# Patient Record
Sex: MALE | Race: WHITE | NOT HISPANIC OR LATINO | Employment: OTHER | URBAN - METROPOLITAN AREA
[De-identification: names, ages, dates, MRNs, and addresses within clinical notes are randomized per-mention and may not be internally consistent; named-entity substitution may affect disease eponyms.]

---

## 2023-05-19 LAB
6-ACETYLMORPHINE: <25 NG/ML
7-AMINOCLONAZEPAM: <25 NG/ML
ALPHA-HYDROXYALPRAZOLAM: <25 NG/ML
ALPHA-HYDROXYMIDAZOLAM: <25 NG/ML
ALPRAZOLAM: <25 NG/ML
AMPHETAMINE (PRESENCE) IN URINE BY SCREEN METHOD: NORMAL
BARBITURATES PRESENCE IN URINE BY SCREEN METHOD: NORMAL
CANNABINOIDS IN URINE BY SCREEN METHOD: NORMAL
CHLORDIAZEPOXIDE: <25 NG/ML
CLONAZEPAM: <25 NG/ML
COCAINE (PRESENCE) IN URINE BY SCREEN METHOD: NORMAL
CODEINE: <50 NG/ML
CREATINE, URINE FOR DRUG: 210.2 MG/DL
DIAZEPAM: <25 NG/ML
DRUG SCREEN COMMENT URINE: NORMAL
EDDP: <25 NG/ML
FENTANYL CONFIRMATION, URINE: <2.5 NG/ML
HYDROCODONE: <25 NG/ML
HYDROMORPHONE: <25 NG/ML
LORAZEPAM: <25 NG/ML
METHADONE CONFIRMATION,URINE: <25 NG/ML
MIDAZOLAM: <25 NG/ML
MORPHINE URINE: <50 NG/ML
NORDIAZEPAM: <25 NG/ML
NORFENTANYL: <2.5 NG/ML
NORHYDROCODONE: <25 NG/ML
NOROXYCODONE: <25 NG/ML
O-DESMETHYLTRAMADOL: <50 NG/ML
OXAZEPAM: <25 NG/ML
OXYCODONE: <25 NG/ML
OXYMORPHONE: <25 NG/ML
PHENCYCLIDINE (PRESENCE) IN URINE BY SCREEN METHOD: NORMAL
TEMAZEPAM: <25 NG/ML
TRAMADOL: <50 NG/ML
ZOLPIDEM METABOLITE (ZCA): <25 NG/ML
ZOLPIDEM: <25 NG/ML

## 2023-06-22 LAB
ALANINE AMINOTRANSFERASE (SGPT) (U/L) IN SER/PLAS: 22 U/L (ref 10–52)
ALBUMIN (G/DL) IN SER/PLAS: 4.6 G/DL (ref 3.4–5)
ALKALINE PHOSPHATASE (U/L) IN SER/PLAS: 93 U/L (ref 33–120)
AMMONIA (UMOL/L) IN PLASMA: 73 UMOL/L
ANION GAP IN SER/PLAS: 16 MMOL/L (ref 10–20)
ASPARTATE AMINOTRANSFERASE (SGOT) (U/L) IN SER/PLAS: 18 U/L (ref 9–39)
BASOPHILS (10*3/UL) IN BLOOD BY AUTOMATED COUNT: 0.08 X10E9/L (ref 0–0.1)
BASOPHILS/100 LEUKOCYTES IN BLOOD BY AUTOMATED COUNT: 0.8 % (ref 0–2)
BILIRUBIN DIRECT (MG/DL) IN SER/PLAS: 0.1 MG/DL (ref 0–0.3)
BILIRUBIN TOTAL (MG/DL) IN SER/PLAS: 0.6 MG/DL (ref 0–1.2)
CALCIUM (MG/DL) IN SER/PLAS: 9.6 MG/DL (ref 8.6–10.6)
CARBON DIOXIDE, TOTAL (MMOL/L) IN SER/PLAS: 24 MMOL/L (ref 21–32)
CHLORIDE (MMOL/L) IN SER/PLAS: 108 MMOL/L (ref 98–107)
CHOLESTEROL (MG/DL) IN SER/PLAS: 159 MG/DL (ref 0–199)
CHOLESTEROL IN HDL (MG/DL) IN SER/PLAS: 34.3 MG/DL
CHOLESTEROL/HDL RATIO: 4.6
CREATININE (MG/DL) IN SER/PLAS: 0.95 MG/DL (ref 0.5–1.3)
EOSINOPHILS (10*3/UL) IN BLOOD BY AUTOMATED COUNT: 0.23 X10E9/L (ref 0–0.7)
EOSINOPHILS/100 LEUKOCYTES IN BLOOD BY AUTOMATED COUNT: 2.4 % (ref 0–6)
ERYTHROCYTE DISTRIBUTION WIDTH (RATIO) BY AUTOMATED COUNT: 16.3 % (ref 11.5–14.5)
ERYTHROCYTE MEAN CORPUSCULAR HEMOGLOBIN CONCENTRATION (G/DL) BY AUTOMATED: 31.5 G/DL (ref 32–36)
ERYTHROCYTE MEAN CORPUSCULAR VOLUME (FL) BY AUTOMATED COUNT: 87 FL (ref 80–100)
ERYTHROCYTES (10*6/UL) IN BLOOD BY AUTOMATED COUNT: 7.55 X10E12/L (ref 4.5–5.9)
GFR MALE: >90 ML/MIN/1.73M2
GLUCOSE (MG/DL) IN SER/PLAS: 74 MG/DL (ref 74–99)
HEMATOCRIT (%) IN BLOOD BY AUTOMATED COUNT: 65.5 % (ref 41–52)
HEMOGLOBIN (G/DL) IN BLOOD: 20.6 G/DL (ref 13.5–17.5)
IMMATURE GRANULOCYTES/100 LEUKOCYTES IN BLOOD BY AUTOMATED COUNT: 0.3 % (ref 0–0.9)
KEPPRA: 21 UG/ML (ref 10–40)
LDL: 84 MG/DL (ref 0–99)
LEUKOCYTES (10*3/UL) IN BLOOD BY AUTOMATED COUNT: 9.5 X10E9/L (ref 4.4–11.3)
LYMPHOCYTES (10*3/UL) IN BLOOD BY AUTOMATED COUNT: 4.04 X10E9/L (ref 1.2–4.8)
LYMPHOCYTES/100 LEUKOCYTES IN BLOOD BY AUTOMATED COUNT: 42.7 % (ref 13–44)
MAGNESIUM (MG/DL) IN SER/PLAS: 2.13 MG/DL (ref 1.6–2.4)
MONOCYTES (10*3/UL) IN BLOOD BY AUTOMATED COUNT: 0.69 X10E9/L (ref 0.1–1)
MONOCYTES/100 LEUKOCYTES IN BLOOD BY AUTOMATED COUNT: 7.3 % (ref 2–10)
NEUTROPHILS (10*3/UL) IN BLOOD BY AUTOMATED COUNT: 4.39 X10E9/L (ref 1.2–7.7)
NEUTROPHILS/100 LEUKOCYTES IN BLOOD BY AUTOMATED COUNT: 46.5 % (ref 40–80)
NON HDL CHOLESTEROL: 125 MG/DL
NRBC (PER 100 WBCS) BY AUTOMATED COUNT: 0 /100 WBC (ref 0–0)
PLATELETS (10*3/UL) IN BLOOD AUTOMATED COUNT: 209 X10E9/L (ref 150–450)
POTASSIUM (MMOL/L) IN SER/PLAS: 3.5 MMOL/L (ref 3.5–5.3)
PROTEIN TOTAL: 6.6 G/DL (ref 6.4–8.2)
SODIUM (MMOL/L) IN SER/PLAS: 144 MMOL/L (ref 136–145)
TRIGLYCERIDE (MG/DL) IN SER/PLAS: 204 MG/DL (ref 0–149)
UREA NITROGEN (MG/DL) IN SER/PLAS: 21 MG/DL (ref 6–23)
VLDL: 41 MG/DL (ref 0–40)

## 2023-06-26 LAB — OXCARB OR ESLICARB METABOLITE (MHD): 13 UG/ML (ref 3–35)

## 2023-07-19 LAB
AMMONIA (UMOL/L) IN PLASMA: NORMAL
MAGNESIUM (MG/DL) IN SER/PLAS: NORMAL

## 2023-09-29 LAB
ALANINE AMINOTRANSFERASE (SGPT) (U/L) IN SER/PLAS: 18 U/L (ref 10–52)
ALBUMIN (G/DL) IN SER/PLAS: 4.6 G/DL (ref 3.4–5)
ALKALINE PHOSPHATASE (U/L) IN SER/PLAS: 90 U/L (ref 33–120)
AMMONIA (UMOL/L) IN PLASMA: 54 UMOL/L
AMMONIA (UMOL/L) IN PLASMA: NORMAL
ANION GAP IN SER/PLAS: 14 MMOL/L (ref 10–20)
ASPARTATE AMINOTRANSFERASE (SGOT) (U/L) IN SER/PLAS: 15 U/L (ref 9–39)
BASOPHILS (10*3/UL) IN BLOOD BY AUTOMATED COUNT: 0.05 X10E9/L (ref 0–0.1)
BASOPHILS/100 LEUKOCYTES IN BLOOD BY AUTOMATED COUNT: 0.6 % (ref 0–2)
BILIRUBIN DIRECT (MG/DL) IN SER/PLAS: 0.1 MG/DL (ref 0–0.3)
BILIRUBIN TOTAL (MG/DL) IN SER/PLAS: 0.5 MG/DL (ref 0–1.2)
CALCIUM (MG/DL) IN SER/PLAS: 9.7 MG/DL (ref 8.6–10.6)
CARBON DIOXIDE, TOTAL (MMOL/L) IN SER/PLAS: 22 MMOL/L (ref 21–32)
CHLORIDE (MMOL/L) IN SER/PLAS: 111 MMOL/L (ref 98–107)
CHOLESTEROL (MG/DL) IN SER/PLAS: 155 MG/DL (ref 0–199)
CHOLESTEROL IN HDL (MG/DL) IN SER/PLAS: 37.5 MG/DL
CHOLESTEROL/HDL RATIO: 4.1
CREATININE (MG/DL) IN SER/PLAS: 1.07 MG/DL (ref 0.5–1.3)
EOSINOPHILS (10*3/UL) IN BLOOD BY AUTOMATED COUNT: 0.15 X10E9/L (ref 0–0.7)
EOSINOPHILS/100 LEUKOCYTES IN BLOOD BY AUTOMATED COUNT: 1.9 % (ref 0–6)
ERYTHROCYTE DISTRIBUTION WIDTH (RATIO) BY AUTOMATED COUNT: 13.4 % (ref 11.5–14.5)
ERYTHROCYTE MEAN CORPUSCULAR HEMOGLOBIN CONCENTRATION (G/DL) BY AUTOMATED: 30.3 G/DL (ref 32–36)
ERYTHROCYTE MEAN CORPUSCULAR VOLUME (FL) BY AUTOMATED COUNT: 89 FL (ref 80–100)
ERYTHROCYTES (10*6/UL) IN BLOOD BY AUTOMATED COUNT: 5.98 X10E12/L (ref 4.5–5.9)
GFR MALE: 80 ML/MIN/1.73M2
GLUCOSE (MG/DL) IN SER/PLAS: 97 MG/DL (ref 74–99)
HEMATOCRIT (%) IN BLOOD BY AUTOMATED COUNT: 53.1 % (ref 41–52)
HEMOGLOBIN (G/DL) IN BLOOD: 16.1 G/DL (ref 13.5–17.5)
IMMATURE GRANULOCYTES/100 LEUKOCYTES IN BLOOD BY AUTOMATED COUNT: 0.3 % (ref 0–0.9)
KEPPRA: 18 UG/ML (ref 10–40)
LDL: 87 MG/DL (ref 0–99)
LEUKOCYTES (10*3/UL) IN BLOOD BY AUTOMATED COUNT: 7.9 X10E9/L (ref 4.4–11.3)
LYMPHOCYTES (10*3/UL) IN BLOOD BY AUTOMATED COUNT: 3.62 X10E9/L (ref 1.2–4.8)
LYMPHOCYTES/100 LEUKOCYTES IN BLOOD BY AUTOMATED COUNT: 46 % (ref 13–44)
MAGNESIUM (MG/DL) IN SER/PLAS: 2.36 MG/DL (ref 1.6–2.4)
MONOCYTES (10*3/UL) IN BLOOD BY AUTOMATED COUNT: 0.58 X10E9/L (ref 0.1–1)
MONOCYTES/100 LEUKOCYTES IN BLOOD BY AUTOMATED COUNT: 7.4 % (ref 2–10)
NEUTROPHILS (10*3/UL) IN BLOOD BY AUTOMATED COUNT: 3.45 X10E9/L (ref 1.2–7.7)
NEUTROPHILS/100 LEUKOCYTES IN BLOOD BY AUTOMATED COUNT: 43.8 % (ref 40–80)
NRBC (PER 100 WBCS) BY AUTOMATED COUNT: 0 /100 WBC (ref 0–0)
PLATELETS (10*3/UL) IN BLOOD AUTOMATED COUNT: 175 X10E9/L (ref 150–450)
POTASSIUM (MMOL/L) IN SER/PLAS: 4 MMOL/L (ref 3.5–5.3)
PROTEIN TOTAL: 7 G/DL (ref 6.4–8.2)
SODIUM (MMOL/L) IN SER/PLAS: 143 MMOL/L (ref 136–145)
TRIGLYCERIDE (MG/DL) IN SER/PLAS: 155 MG/DL (ref 0–149)
UREA NITROGEN (MG/DL) IN SER/PLAS: 17 MG/DL (ref 6–23)
VLDL: 31 MG/DL (ref 0–40)

## 2023-10-03 LAB — OXCARB OR ESLICARB METABOLITE (MHD): 13 UG/ML (ref 3–35)

## 2023-11-15 ENCOUNTER — TELEMEDICINE (OUTPATIENT)
Dept: NEUROLOGY | Facility: CLINIC | Age: 58
End: 2023-11-15
Payer: MEDICARE

## 2023-11-15 DIAGNOSIS — G40.109 FOCAL EPILEPSY (MULTI): Primary | ICD-10-CM

## 2023-11-15 PROCEDURE — 99214 OFFICE O/P EST MOD 30 MIN: CPT | Performed by: NURSE PRACTITIONER

## 2023-11-15 PROCEDURE — 99214 OFFICE O/P EST MOD 30 MIN: CPT | Mod: GT,PO,95 | Performed by: NURSE PRACTITIONER

## 2023-11-15 RX ORDER — LEVETIRACETAM 500 MG/1
TABLET ORAL
Qty: 360 TABLET | Refills: 3 | Status: SHIPPED | OUTPATIENT
Start: 2023-11-15 | End: 2024-11-14

## 2023-11-15 RX ORDER — LEVETIRACETAM 1000 MG/1
1000 TABLET ORAL 2 TIMES DAILY
COMMUNITY
End: 2023-11-15 | Stop reason: SDUPTHER

## 2023-11-15 RX ORDER — LEVETIRACETAM 1000 MG/1
1000 TABLET ORAL 2 TIMES DAILY
Qty: 180 TABLET | Refills: 3 | Status: SHIPPED | OUTPATIENT
Start: 2023-11-15 | End: 2023-11-15 | Stop reason: WASHOUT

## 2023-11-15 RX ORDER — OXCARBAZEPINE 300 MG/1
300 TABLET, FILM COATED ORAL 2 TIMES DAILY
Qty: 180 TABLET | Refills: 3 | Status: SHIPPED | OUTPATIENT
Start: 2023-11-15 | End: 2024-11-14

## 2023-11-15 RX ORDER — OXCARBAZEPINE 300 MG/1
300 TABLET, FILM COATED ORAL 2 TIMES DAILY
COMMUNITY
Start: 2019-04-23 | End: 2023-11-15 | Stop reason: SDUPTHER

## 2023-11-15 NOTE — PROGRESS NOTES
Virtual or Telephone Consent    An interactive audio and video telecommunication system which permits real time communications between the patient (at the originating site) and provider (at the distant site) was utilized to provide this telehealth service.   Verbal consent was requested and obtained from Edin Delgado on this date, 11/15/23 for a telehealth visit.        Patient ID:  Edin Delgado 58 y.o.male presenting in follow-up for epilepsy.     HPI    Epilepsy Classification:  Epileptic Paroxysmal Episodes  Epileptogenic Zone: left temporal                 Epileptic seizure semiology:  1. Type 1: Dialeptic seizures ? bilateral clonic seizures                      Frequency: none for years, mostly during childhood.                  2. Type 2: asymptomatic left temporal EEG seizure pattern               Frequency: during hemiplegic migraine attack.               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 No seizures, complaint with his medication - no side effects. Levels are therapeutic. No falls, he is sleeping through the night         Review of Systems   All other systems reviewed and are negative.        RESULTS:  No EEG results found for the past 12 months    Orders Only on 2023   Component Date Value    Ammonia 2023 54 (A)     Glucose 2023 97     Sodium 2023 143     Potassium 2023 4.0     Chloride 2023 111 (H)     Bicarbonate 2023 22     Anion Gap 2023 14     Urea Nitrogen 2023 17     Creatinine 2023 1.07     GFR MALE 2023 80     Calcium 2023 9.7     Albumin 2023 4.6     Alkaline Phosphatase 2023 90     Total  Protein 09/29/2023 7.0     AST 09/29/2023 15     Total Bilirubin 09/29/2023 0.5     ALT (SGPT) 09/29/2023 18     Cholesterol 09/29/2023 155     HDL 09/29/2023 37.5 (A)     Cholesterol/HDL Ratio 09/29/2023 4.1     LDL 09/29/2023 87     VLDL 09/29/2023 31     Triglycerides 09/29/2023 155 (H)     Magnesium 09/29/2023 2.36     WBC 09/29/2023 7.9     nRBC 09/29/2023 0.0     RBC 09/29/2023 5.98 (H)     Hemoglobin 09/29/2023 16.1     Hematocrit 09/29/2023 53.1 (H)     MCV 09/29/2023 89     MCHC 09/29/2023 30.3 (L)     Platelets 09/29/2023 175     RDW 09/29/2023 13.4     Neutrophils % 09/29/2023 43.8     Immature Granulocytes %,* 09/29/2023 0.3     Lymphocytes % 09/29/2023 46.0     Monocytes % 09/29/2023 7.4     Eosinophils % 09/29/2023 1.9     Basophils % 09/29/2023 0.6     Neutrophils Absolute 09/29/2023 3.45     Lymphocytes Absolute 09/29/2023 3.62     Monocytes Absolute 09/29/2023 0.58     Eosinophils Absolute 09/29/2023 0.15     Basophils Absolute 09/29/2023 0.05     Oxcarb or Eslicarb Metab* 09/29/2023 13     KEPPRA 09/29/2023 18     Bilirubin, Direct 09/29/2023 0.1     Ammonia 09/29/2023 CANCELED        No results found for this or any previous visit (from the past 4464 hour(s)).    No MRI head results found for the past 12 months    No CT head results found for the past 12 months    No results found for this or any previous visit (from the past 4464 hour(s)).    Results for orders placed or performed in visit on 07/25/19   EEG    Narrative    Ordered by an unspecified provider.   Results for orders placed or performed in visit on 03/30/18   MR BRAIN W AND WO IV CONTRAST    Narrative    MRN: [id number]  Patient Name: JOHN FARLEY     STUDY:  MRI BRAIN W/WO CONTRAST; NR MRA HEAD W/O C; NR MRA NECK WO.C;  3/30/2018 12:18 pm     INDICATION:  Signs/Symptoms: Upgoing plantar reflex, unilateral hypertonia, Lie  Flat: Yes, Pre Med: No; Signs/Symptoms: Left sided weakness,  intermittent, concerning for conversion disorder,  "Lie Flat: Yes, Pre  Med: No.     COMPARISON:  May 2004.     ACCESSION NUMBER(S):  [id number]; [id number]; [id number]     ORDERING CLINICIAN:  LEENA NICOLAS     TECHNIQUE:  The brain was studied in the sagittal, axial and coronal planes  utilizing FLAIR, T1 and T2 weighted images both before and following  intravenous injection of 20 cc MultiHance.     FINDINGS:  *There is increased signal within the right cerebral cortex on FLAIR  and T2 weighted images in association with increased signal on  diffusion-weighted images not confirmed on ADC. The findings are  consistent with completed infarction in the distribution of the right  middle cerebral artery or right hemispheric encephalitis.  *There is no associated hemorrhage or mass effect  *There is no evidence of intracranial mass or extra-axial collection  *The skull base, paranasal sinuses and orbital structures are normal  *The brainstem and cerebellum are normal.  *Following contrast injection, there is gyriform enhancement  throughout the right hemisphere. This is a nonspecific finding  compatible with completed infarction as well as enhancement due to an  inflammatory process such as encephalitis with  * Magnetic resonance angiography  Axial 3-D time-of-flight acquisition was performed and multiplanar  reconstructions were made.  MRA at the carotid bifurcations reveals normal anatomic  configuration.  The vertebral arteries are normal.  There is no  evidence of narrowing or stenosis.  *MRA of the intracranial vessels demonstrates normal distal carotid  arteries, normal vertebral arteries, normal vertebrobasilar junction  and normal basilar artery. There is prominence of the right middle  cerebral artery possibly due to \"luxury perfusion\".     IMPRESSION:  *Findings consistent with completed infarct with gyriform enhancement  and luxury perfusion in the right hemisphere  *Findings consistent with right hemispheric encephalitis/cerebritis.  *THIS " EXAMINATION WAS INTERPRETED AT [facility] [place]   [place] ANGIO NECK WO IV CONTRAST    Narrative    MRN: [id number]  Patient Name: JOHN FARLEY     STUDY:  MRI BRAIN W/WO CONTRAST; NR MRA HEAD W/O C; NR MRA NECK WO.C;  3/30/2018 12:18 pm     INDICATION:  Signs/Symptoms: Upgoing plantar reflex, unilateral hypertonia, Lie  Flat: Yes, Pre Med: No; Signs/Symptoms: Left sided weakness,  intermittent, concerning for conversion disorder, Lie Flat: Yes, Pre  Med: No.     COMPARISON:  May 2004.     ACCESSION NUMBER(S):  [id number]; [id number]; [id number]     ORDERING CLINICIAN:  LEENA INIGUEZ; PENNY NICOLAS     TECHNIQUE:  The brain was studied in the sagittal, axial and coronal planes  utilizing FLAIR, T1 and T2 weighted images both before and following  intravenous injection of 20 cc MultiHance.     FINDINGS:  *There is increased signal within the right cerebral cortex on FLAIR  and T2 weighted images in association with increased signal on  diffusion-weighted images not confirmed on ADC. The findings are  consistent with completed infarction in the distribution of the right  middle cerebral artery or right hemispheric encephalitis.  *There is no associated hemorrhage or mass effect  *There is no evidence of intracranial mass or extra-axial collection  *The skull base, paranasal sinuses and orbital structures are normal  *The brainstem and cerebellum are normal.  *Following contrast injection, there is gyriform enhancement  throughout the right hemisphere. This is a nonspecific finding  compatible with completed infarction as well as enhancement due to an  inflammatory process such as encephalitis with  * Magnetic resonance angiography  Axial 3-D time-of-flight acquisition was performed and multiplanar  reconstructions were made.  MRA at the carotid bifurcations reveals normal anatomic  configuration.  The vertebral arteries are normal.  There is no  evidence of narrowing or stenosis.  *MRA of the intracranial vessels demonstrates normal  "distal carotid  arteries, normal vertebral arteries, normal vertebrobasilar junction  and normal basilar artery. There is prominence of the right middle  cerebral artery possibly due to \"luxury perfusion\".     IMPRESSION:  *Findings consistent with completed infarct with gyriform enhancement  and luxury perfusion in the right hemisphere  *Findings consistent with right hemispheric encephalitis/cerebritis.  *THIS EXAMINATION WAS INTERPRETED [place] [facility] [place]   [place] ANGIO HEAD WO IV CONTRAST    Narrative    MRN: [id number]  Patient Name: JOHN FARLEY     STUDY:  MRI BRAIN W/WO CONTRAST; NR MRA HEAD W/O C; NR MRA NECK WO.C;  3/30/2018 12:18 pm     INDICATION:  Signs/Symptoms: Upgoing plantar reflex, unilateral hypertonia, Lie  Flat: Yes, Pre Med: No; Signs/Symptoms: Left sided weakness,  intermittent, concerning for conversion disorder, Lie Flat: Yes, Pre  Med: No.     COMPARISON:  May 2004.     ACCESSION NUMBER(S):  [id number]; [id number]; [id number]     ORDERING CLINICIAN:  LEENA NICOLAS     TECHNIQUE:  The brain was studied in the sagittal, axial and coronal planes  utilizing FLAIR, T1 and T2 weighted images both before and following  intravenous injection of 20 cc MultiHance.     FINDINGS:  *There is increased signal within the right cerebral cortex on FLAIR  and T2 weighted images in association with increased signal on  diffusion-weighted images not confirmed on ADC. The findings are  consistent with completed infarction in the distribution of the right  middle cerebral artery or right hemispheric encephalitis.  *There is no associated hemorrhage or mass effect  *There is no evidence of intracranial mass or extra-axial collection  *The skull base, paranasal sinuses and orbital structures are normal  *The brainstem and cerebellum are normal.  *Following contrast injection, there is gyriform enhancement  throughout the right hemisphere. This is a nonspecific finding  compatible with completed infarction as " "well as enhancement due to an  inflammatory process such as encephalitis with  * Magnetic resonance angiography  Axial 3-D time-of-flight acquisition was performed and multiplanar  reconstructions were made.  MRA at the carotid bifurcations reveals normal anatomic  configuration.  The vertebral arteries are normal.  There is no  evidence of narrowing or stenosis.  *MRA of the intracranial vessels demonstrates normal distal carotid  arteries, normal vertebral arteries, normal vertebrobasilar junction  and normal basilar artery. There is prominence of the right middle  cerebral artery possibly due to \"luxury perfusion\".     IMPRESSION:  *Findings consistent with completed infarct with gyriform enhancement  and luxury perfusion in the right hemisphere  *Findings consistent with right hemispheric encephalitis/cerebritis.  [place] [facility] [place] were no vitals filed for this visit.    Neurologic Exam     Mental Status   Oriented to person, place, and time.   Attention: normal. Concentration: normal.   Level of consciousness: alert  Knowledge: consistent with education.   Normal comprehension.     Cranial Nerves   Eye movements intact, no facial droop, hearing grossly intact      Gait, Coordination, and Reflexes No focal deficits noted            ASSESSMENT & PLAN:   58 y.o. male presenting in follow-up for peviously diagnosed epilepsy. Semiology as described above    Problem List Items Addressed This Visit       Focal epilepsy (CMS/[facility]) - Primary     Stable, none for years   Continue -300 and -1500  Levels were therapeutic   RTC 9 months          Relevant Medications    OXcarbazepine (Trileptal) 300 mg tablet    levETIRAcetam (Keppra) 500 mg tablet                    "

## 2023-11-20 ENCOUNTER — OFFICE VISIT (OUTPATIENT)
Dept: NEUROLOGY | Facility: CLINIC | Age: 58
End: 2023-11-20
Payer: MEDICARE

## 2023-11-20 VITALS — RESPIRATION RATE: 16 BRPM | SYSTOLIC BLOOD PRESSURE: 100 MMHG | HEART RATE: 100 BPM | DIASTOLIC BLOOD PRESSURE: 60 MMHG

## 2023-11-20 DIAGNOSIS — G43.711 CHRONIC MIGRAINE WITHOUT AURA, INTRACTABLE, WITH STATUS MIGRAINOSUS: Primary | ICD-10-CM

## 2023-11-20 DIAGNOSIS — G40.109 FOCAL EPILEPSY (MULTI): ICD-10-CM

## 2023-11-20 PROCEDURE — 99214 OFFICE O/P EST MOD 30 MIN: CPT | Performed by: PSYCHIATRY & NEUROLOGY

## 2023-11-20 PROCEDURE — 1036F TOBACCO NON-USER: CPT | Performed by: PSYCHIATRY & NEUROLOGY

## 2023-11-20 RX ORDER — ERENUMAB-AOOE 140 MG/ML
INJECTION, SOLUTION SUBCUTANEOUS
COMMUNITY
Start: 2018-09-07 | End: 2024-04-17

## 2023-11-20 RX ORDER — DOCUSATE SODIUM 100 MG/1
100 CAPSULE, LIQUID FILLED ORAL
COMMUNITY
Start: 2019-11-01

## 2023-11-20 RX ORDER — SENNOSIDES 8.6 MG/1
17.2 TABLET ORAL
COMMUNITY
Start: 2020-07-02

## 2023-11-20 RX ORDER — DICLOFENAC SODIUM 10 MG/G
GEL TOPICAL
COMMUNITY
Start: 2023-08-21

## 2023-11-20 RX ORDER — OLANZAPINE 2.5 MG/1
1 TABLET ORAL 2 TIMES DAILY
COMMUNITY

## 2023-11-20 RX ORDER — CYCLOBENZAPRINE HCL 10 MG
TABLET ORAL
COMMUNITY
Start: 2023-03-06

## 2023-11-20 RX ORDER — LORAZEPAM 0.5 MG/1
TABLET ORAL
COMMUNITY
Start: 2020-08-20

## 2023-11-20 RX ORDER — ACETAZOLAMIDE 125 MG/1
TABLET ORAL 2 TIMES DAILY
COMMUNITY
Start: 2020-07-02 | End: 2024-02-02

## 2023-11-20 RX ORDER — ERGOCALCIFEROL 1.25 MG/1
1 CAPSULE ORAL
COMMUNITY
Start: 2018-05-23

## 2023-11-20 RX ORDER — IBUPROFEN 200 MG
TABLET ORAL
COMMUNITY
Start: 2020-07-02 | End: 2023-12-06

## 2023-11-20 RX ORDER — PANTOPRAZOLE SODIUM 40 MG/1
TABLET, DELAYED RELEASE ORAL
COMMUNITY
Start: 2019-11-01

## 2023-11-20 RX ORDER — 1.1% SODIUM FLUORIDE 11 MG/G
GEL DENTAL
COMMUNITY
Start: 2023-11-17

## 2023-11-20 RX ORDER — ATORVASTATIN CALCIUM 40 MG/1
1 TABLET, FILM COATED ORAL DAILY
COMMUNITY
Start: 2018-05-23

## 2023-11-20 RX ORDER — MAGNESIUM L-LACTATE 84 MG
TABLET, EXTENDED RELEASE ORAL
COMMUNITY
Start: 2019-11-01

## 2023-11-20 RX ORDER — RIFAXIMIN 550 MG/1
TABLET ORAL
COMMUNITY
Start: 2020-07-02

## 2023-11-20 RX ORDER — SODIUM FLUORIDE 1.1 G/100G
CREAM ORAL
COMMUNITY
Start: 2023-02-27

## 2023-11-20 RX ORDER — LORATADINE 10 MG/1
TABLET ORAL
COMMUNITY
Start: 2023-10-24

## 2023-11-20 ASSESSMENT — PATIENT HEALTH QUESTIONNAIRE - PHQ9
1. LITTLE INTEREST OR PLEASURE IN DOING THINGS: NOT AT ALL
2. FEELING DOWN, DEPRESSED OR HOPELESS: NOT AT ALL
SUM OF ALL RESPONSES TO PHQ9 QUESTIONS 1 AND 2: 0

## 2023-11-20 NOTE — PROGRESS NOTES
"  Experiencing 2-3 headaches per month on average. ( has ATP 1 A2 mutation) has had L temporal seizure/hemiplegic migraine semiology. Has Lorazepam for this but has not used in 2.5 years      Ibuprofen is helpful.   Does not take extra ibuprofen if has a headache. Able to work through it.     Takes 400mg ibuprofen every evening for other aches and pains and sometimes prevent headache.   Denies stomach upset from daily ibuprofen use.     Has rizatriptan and Lorazepam for hemiplegic migraine.  Has not needed in 2.5 years.     Continues Aimovig every 28 days. This works better than monthly dose.     Migraines occur left side or right side.  frontal and radiates to other side if becomes severe. Rare in occipital area anymore but did have one several weeks ago  Associated light sensitivity, noise sensitivity.   Triggers may be loud noises.     Sleeps well. \"Because of all the pills I take\"  Mood has been stable. Feels controlled.     Has copy of recent labs.     Had Flexeril for back pain after fall and twisting of back.     OARRS:  No data recorded  I have personally reviewed the OARRS report for Edin Delgado. I have considered the risks of abuse, dependence, addiction and diversion    Is the patient prescribed a combination of a benzodiazepine and opioid?  No    Last Urine Drug Screen / ordered today: No  Recent Results (from the past 8760 hour(s))   OPIATE/OPIOID/BENZO PRESCRIPTION COMPLIANCE    Collection Time: 05/17/23  1:12 PM   Result Value Ref Range    DRUG SCREEN COMMENT URINE SEE BELOW     Creatine, Urine 210.2 mg/dL    Amphetamine Screen, Urine PRESUMPTIVE NEGATIVE NEGATIVE    Barbiturate Screen, Urine PRESUMPTIVE NEGATIVE NEGATIVE    Cannabinoid Screen, Urine PRESUMPTIVE NEGATIVE NEGATIVE    Cocaine Screen, Urine PRESUMPTIVE NEGATIVE NEGATIVE    PCP Screen, Urine PRESUMPTIVE NEGATIVE NEGATIVE    7-Aminoclonazepam <25 Cutoff <25 ng/mL    Alpha-Hydroxyalprazolam <25 Cutoff <25 ng/mL    Alpha-Hydroxymidazolam " <25 Cutoff <25 ng/mL    Alprazolam <25 Cutoff <25 ng/mL    Chlordiazepoxide <25 Cutoff <25 ng/mL    Clonazepam <25 Cutoff <25 ng/mL    Diazepam <25 Cutoff <25 ng/mL    Lorazepam <25 Cutoff <25 ng/mL    Midazolam <25 Cutoff <25 ng/mL    Nordiazepam <25 Cutoff <25 ng/mL    Oxazepam <25 Cutoff <25 ng/mL    Temazepam <25 Cutoff <25 ng/mL    Zolpidem <25 Cutoff <25 ng/mL    Zolpidem Metabolite (ZCA) <25 Cutoff <25 ng/mL    6-Acetylmorphine <25 Cutoff <25 ng/mL    Codeine <50 Cutoff <50 ng/mL    Hydrocodone <25 Cutoff <25 ng/mL    Hydromorphone <25 Cutoff <25 ng/mL    Morphine Urine <50 Cutoff <50 ng/mL    Norhydrocodone <25 Cutoff <25 ng/mL    Noroxycodone <25 Cutoff <25 ng/mL    Oxycodone <25 Cutoff <25 ng/mL    Oxymorphone <25 Cutoff <25 ng/mL    Tramadol <50 Cutoff <50 ng/mL    O-Desmethyltramadol <50 Cutoff <50 ng/mL    Fentanyl <2.5 Cutoff<2.5 ng/mL    Norfentanyl <2.5 Cutoff<2.5 ng/mL    METHADONE CONFIRMATION,URINE <25 Cutoff <25 ng/mL    EDDP <25 Cutoff <25 ng/mL     N/A        Controlled Substance Agreement:  Date of the Last Agreement: 11/20/2023  Reviewed Controlled Substance Agreement including but not limited to the benefits, risks, and alternatives to treatment with a Controlled Substance medication(s).    Benzodiazepines:  What is the patient's goal of therapy? Seizure relief  Is this being achieved with current treatment? Yes but has not needed in 2 years    PHILIP-7:  No data recorded    Activities of Daily Living:   Is your overall impression that this patient is benefiting (symptom reduction outweighs side effects) from benzodiazepine therapy? Yes     1. Physical Functioning: Same  2. Family Relationship: Same  3. Social Relationship: Same  4. Mood: Same  5. Sleep Patterns: Same  6. Overall Function: Same    MRI head results: No MRI head results found for the past 12 months   Orders Only on 09/29/2023   Component Date Value    Ammonia 09/29/2023 54 (A)     Glucose 09/29/2023 97     Sodium 09/29/2023 143      Potassium 09/29/2023 4.0     Chloride 09/29/2023 111 (H)     Bicarbonate 09/29/2023 22     Anion Gap 09/29/2023 14     Urea Nitrogen 09/29/2023 17     Creatinine 09/29/2023 1.07     GFR MALE 09/29/2023 80     Calcium 09/29/2023 9.7     Albumin 09/29/2023 4.6     Alkaline Phosphatase 09/29/2023 90     Total Protein 09/29/2023 7.0     AST 09/29/2023 15     Total Bilirubin 09/29/2023 0.5     ALT (SGPT) 09/29/2023 18     Cholesterol 09/29/2023 155     HDL 09/29/2023 37.5 (A)     Cholesterol/HDL Ratio 09/29/2023 4.1     LDL 09/29/2023 87     VLDL 09/29/2023 31     Triglycerides 09/29/2023 155 (H)     Magnesium 09/29/2023 2.36     WBC 09/29/2023 7.9     nRBC 09/29/2023 0.0     RBC 09/29/2023 5.98 (H)     Hemoglobin 09/29/2023 16.1     Hematocrit 09/29/2023 53.1 (H)     MCV 09/29/2023 89     MCHC 09/29/2023 30.3 (L)     Platelets 09/29/2023 175     RDW 09/29/2023 13.4     Neutrophils % 09/29/2023 43.8     Immature Granulocytes %,* 09/29/2023 0.3     Lymphocytes % 09/29/2023 46.0     Monocytes % 09/29/2023 7.4     Eosinophils % 09/29/2023 1.9     Basophils % 09/29/2023 0.6     Neutrophils Absolute 09/29/2023 3.45     Lymphocytes Absolute 09/29/2023 3.62     Monocytes Absolute 09/29/2023 0.58     Eosinophils Absolute 09/29/2023 0.15     Basophils Absolute 09/29/2023 0.05     Oxcarb or Eslicarb Metab* 09/29/2023 13     KEPPRA 09/29/2023 18     Bilirubin, Direct 09/29/2023 0.1     Ammonia 09/29/2023 CANCELED    Legacy Encounter on 09/07/2023   Component Date Value    WBC 09/07/2023 6.2     nRBC 09/07/2023 0.0     RBC 09/07/2023 5.71     Hemoglobin 09/07/2023 15.5     Hematocrit 09/07/2023 48.3     MCV 09/07/2023 85     MCHC 09/07/2023 32.1     Platelets 09/07/2023 180     RDW 09/07/2023 13.0     Neutrophils % 09/07/2023 54.7     Immature Granulocytes %,* 09/07/2023 0.3     Lymphocytes % 09/07/2023 35.6     Monocytes % 09/07/2023 7.7     Eosinophils % 09/07/2023 1.4     Basophils % 09/07/2023 0.3     Neutrophils Absolute  09/07/2023 3.39     Lymphocytes Absolute 09/07/2023 2.21     Monocytes Absolute 09/07/2023 0.48     Eosinophils Absolute 09/07/2023 0.09     Basophils Absolute 09/07/2023 0.02    Orders Only on 07/19/2023   Component Date Value    Ammonia 07/19/2023 CANCELED     Magnesium 07/19/2023 CANCELED    Orders Only on 06/22/2023   Component Date Value    Ammonia 06/22/2023 73 (A)     Glucose 06/22/2023 74     Sodium 06/22/2023 144     Potassium 06/22/2023 3.5     Chloride 06/22/2023 108 (H)     Bicarbonate 06/22/2023 24     Anion Gap 06/22/2023 16     Urea Nitrogen 06/22/2023 21     Creatinine 06/22/2023 0.95     GFR MALE 06/22/2023 >90     Calcium 06/22/2023 9.6     Albumin 06/22/2023 4.6     Alkaline Phosphatase 06/22/2023 93     Total Protein 06/22/2023 6.6     AST 06/22/2023 18     Total Bilirubin 06/22/2023 0.6     ALT (SGPT) 06/22/2023 22     Cholesterol 06/22/2023 159     HDL 06/22/2023 34.3 (A)     Cholesterol/HDL Ratio 06/22/2023 4.6     LDL 06/22/2023 84     VLDL 06/22/2023 41 (H)     Triglycerides 06/22/2023 204 (H)     Non HDL Cholesterol 06/22/2023 125     Magnesium 06/22/2023 2.13     WBC 06/22/2023 9.5     nRBC 06/22/2023 0.0     RBC 06/22/2023 7.55 (H)     Hemoglobin 06/22/2023 20.6 (H)     Hematocrit 06/22/2023 65.5 (H)     MCV 06/22/2023 87     MCHC 06/22/2023 31.5 (L)     Platelets 06/22/2023 209     RDW 06/22/2023 16.3 (H)     Neutrophils % 06/22/2023 46.5     Immature Granulocytes %,* 06/22/2023 0.3     Lymphocytes % 06/22/2023 42.7     Monocytes % 06/22/2023 7.3     Eosinophils % 06/22/2023 2.4     Basophils % 06/22/2023 0.8     Neutrophils Absolute 06/22/2023 4.39     Lymphocytes Absolute 06/22/2023 4.04     Monocytes Absolute 06/22/2023 0.69     Eosinophils Absolute 06/22/2023 0.23     Basophils Absolute 06/22/2023 0.08     KEPPRA 06/22/2023 21     Oxcarb or Eslicarb Metab* 06/22/2023 13     Bilirubin, Direct 06/22/2023 0.1

## 2023-12-06 DIAGNOSIS — G43.711 CHRONIC MIGRAINE WITHOUT AURA, INTRACTABLE, WITH STATUS MIGRAINOSUS: ICD-10-CM

## 2023-12-06 RX ORDER — IBUPROFEN 200 MG
TABLET ORAL
Qty: 60 TABLET | Refills: 6 | Status: SHIPPED | OUTPATIENT
Start: 2023-12-06 | End: 2023-12-06

## 2023-12-06 RX ORDER — IBUPROFEN 200 MG
TABLET ORAL
Qty: 60 TABLET | Refills: 5 | Status: SHIPPED | OUTPATIENT
Start: 2023-12-06 | End: 2023-12-14 | Stop reason: SDUPTHER

## 2023-12-14 ENCOUNTER — TELEPHONE (OUTPATIENT)
Dept: NEUROLOGY | Facility: CLINIC | Age: 58
End: 2023-12-14
Payer: MEDICARE

## 2023-12-14 DIAGNOSIS — G43.711 CHRONIC MIGRAINE WITHOUT AURA, INTRACTABLE, WITH STATUS MIGRAINOSUS: ICD-10-CM

## 2023-12-14 RX ORDER — IBUPROFEN 200 MG
TABLET ORAL
Qty: 30 TABLET | Refills: 0 | Status: SHIPPED | OUTPATIENT
Start: 2023-12-14

## 2023-12-14 NOTE — TELEPHONE ENCOUNTER
Care facility called to refill Peter's ibuprofen 800 mg As Needed.  Pharmacy is [facility]  Phone [phone]

## 2023-12-15 DIAGNOSIS — G43.711 CHRONIC MIGRAINE WITHOUT AURA, INTRACTABLE, WITH STATUS MIGRAINOSUS: ICD-10-CM

## 2023-12-15 RX ORDER — IBUPROFEN 800 MG/1
800 TABLET ORAL EVERY 8 HOURS PRN
Qty: 60 TABLET | Refills: 6 | Status: SHIPPED | OUTPATIENT
Start: 2023-12-15 | End: 2023-12-20 | Stop reason: SDUPTHER

## 2023-12-20 ENCOUNTER — TELEPHONE (OUTPATIENT)
Dept: NEUROLOGY | Facility: CLINIC | Age: 58
End: 2023-12-20
Payer: MEDICARE

## 2023-12-20 DIAGNOSIS — G43.711 CHRONIC MIGRAINE WITHOUT AURA, INTRACTABLE, WITH STATUS MIGRAINOSUS: ICD-10-CM

## 2023-12-20 NOTE — TELEPHONE ENCOUNTER
Called and talked to kian- they stated that the old script they had for Edin was twice a day as needed for headache/migraine. So I resent the script over to Dr. Jiménez for it to be corrected. She stated they would cancel out the old script.

## 2023-12-20 NOTE — TELEPHONE ENCOUNTER
Tabitha called and stated that the previous script was twice a day as needed for headache and asked us to change it. So I am resending this over. They stated they will cancel the old order.

## 2023-12-21 RX ORDER — IBUPROFEN 800 MG/1
800 TABLET ORAL 2 TIMES DAILY PRN
Qty: 60 TABLET | Refills: 6 | Status: SHIPPED | OUTPATIENT
Start: 2023-12-21 | End: 2024-07-18

## 2023-12-22 ENCOUNTER — LAB REQUISITION (OUTPATIENT)
Dept: LAB | Facility: HOSPITAL | Age: 58
End: 2023-12-22
Payer: MEDICARE

## 2023-12-22 DIAGNOSIS — E72.20 DISORDER OF UREA CYCLE METABOLISM, UNSPECIFIED (MULTI): ICD-10-CM

## 2023-12-22 DIAGNOSIS — K21.9 GASTRO-ESOPHAGEAL REFLUX DISEASE WITHOUT ESOPHAGITIS: ICD-10-CM

## 2023-12-22 DIAGNOSIS — Z12.5 ENCOUNTER FOR SCREENING FOR MALIGNANT NEOPLASM OF PROSTATE: ICD-10-CM

## 2023-12-22 DIAGNOSIS — E55.9 VITAMIN D DEFICIENCY, UNSPECIFIED: ICD-10-CM

## 2023-12-22 DIAGNOSIS — F25.9 SCHIZOAFFECTIVE DISORDER, UNSPECIFIED (MULTI): ICD-10-CM

## 2023-12-22 DIAGNOSIS — I10 ESSENTIAL (PRIMARY) HYPERTENSION: ICD-10-CM

## 2023-12-22 DIAGNOSIS — E78.5 HYPERLIPIDEMIA, UNSPECIFIED: ICD-10-CM

## 2023-12-22 DIAGNOSIS — G40.409 OTHER GENERALIZED EPILEPSY AND EPILEPTIC SYNDROMES, NOT INTRACTABLE, WITHOUT STATUS EPILEPTICUS (MULTI): ICD-10-CM

## 2023-12-22 DIAGNOSIS — G43.409 HEMIPLEGIC MIGRAINE, NOT INTRACTABLE, WITHOUT STATUS MIGRAINOSUS: ICD-10-CM

## 2023-12-22 LAB
25(OH)D3 SERPL-MCNC: 70 NG/ML (ref 30–100)
AMMONIA PLAS-SCNC: 83 UMOL/L (ref 16–53)
BASOPHILS # BLD AUTO: 0.05 X10*3/UL (ref 0–0.1)
BASOPHILS NFR BLD AUTO: 0.7 %
EOSINOPHIL # BLD AUTO: 0.14 X10*3/UL (ref 0–0.7)
EOSINOPHIL NFR BLD AUTO: 1.9 %
ERYTHROCYTE [DISTWIDTH] IN BLOOD BY AUTOMATED COUNT: 13.2 % (ref 11.5–14.5)
HCT VFR BLD AUTO: 48.3 % (ref 41–52)
HGB BLD-MCNC: 15.7 G/DL (ref 13.5–17.5)
HOLD SPECIMEN: NORMAL
HOLD SPECIMEN: NORMAL
IMM GRANULOCYTES # BLD AUTO: 0.02 X10*3/UL (ref 0–0.7)
IMM GRANULOCYTES NFR BLD AUTO: 0.3 % (ref 0–0.9)
LEVETIRACETAM SERPL-MCNC: 21 UG/ML (ref 10–40)
LYMPHOCYTES # BLD AUTO: 3.42 X10*3/UL (ref 1.2–4.8)
LYMPHOCYTES NFR BLD AUTO: 46.1 %
MAGNESIUM SERPL-MCNC: 2.12 MG/DL (ref 1.6–2.4)
MCH RBC QN AUTO: 27.7 PG (ref 26–34)
MCHC RBC AUTO-ENTMCNC: 32.5 G/DL (ref 32–36)
MCV RBC AUTO: 85 FL (ref 80–100)
MONOCYTES # BLD AUTO: 0.6 X10*3/UL (ref 0.1–1)
MONOCYTES NFR BLD AUTO: 8.1 %
NEUTROPHILS # BLD AUTO: 3.19 X10*3/UL (ref 1.2–7.7)
NEUTROPHILS NFR BLD AUTO: 42.9 %
NRBC BLD-RTO: 0 /100 WBCS (ref 0–0)
PLATELET # BLD AUTO: 174 X10*3/UL (ref 150–450)
PSA SERPL-MCNC: 0.58 NG/ML
RBC # BLD AUTO: 5.67 X10*6/UL (ref 4.5–5.9)
TSH SERPL-ACNC: 1.55 MIU/L (ref 0.44–3.98)
WBC # BLD AUTO: 7.4 X10*3/UL (ref 4.4–11.3)

## 2023-12-22 PROCEDURE — 84153 ASSAY OF PSA TOTAL: CPT | Mod: OUT,PARLAB | Performed by: INTERNAL MEDICINE

## 2023-12-22 PROCEDURE — 83735 ASSAY OF MAGNESIUM: CPT | Mod: OUT | Performed by: INTERNAL MEDICINE

## 2023-12-22 PROCEDURE — 82306 VITAMIN D 25 HYDROXY: CPT | Mod: OUT,PARLAB | Performed by: INTERNAL MEDICINE

## 2023-12-22 PROCEDURE — 85025 COMPLETE CBC W/AUTO DIFF WBC: CPT | Mod: OUT | Performed by: INTERNAL MEDICINE

## 2023-12-22 PROCEDURE — 82140 ASSAY OF AMMONIA: CPT | Mod: OUT | Performed by: INTERNAL MEDICINE

## 2023-12-22 PROCEDURE — 84443 ASSAY THYROID STIM HORMONE: CPT | Mod: OUT | Performed by: INTERNAL MEDICINE

## 2023-12-22 PROCEDURE — 80183 DRUG SCRN QUANT OXCARBAZEPIN: CPT | Mod: OUT | Performed by: INTERNAL MEDICINE

## 2023-12-22 PROCEDURE — 80177 DRUG SCRN QUAN LEVETIRACETAM: CPT | Mod: OUT,PARLAB | Performed by: INTERNAL MEDICINE

## 2023-12-25 LAB — 10OH-CARBAZEPINE SERPL-MCNC: 12 UG/ML (ref 3–35)

## 2024-02-01 DIAGNOSIS — G43.711 CHRONIC MIGRAINE WITHOUT AURA, INTRACTABLE, WITH STATUS MIGRAINOSUS: ICD-10-CM

## 2024-02-02 RX ORDER — ACETAZOLAMIDE 125 MG/1
TABLET ORAL
Qty: 60 TABLET | Refills: 5 | Status: SHIPPED | OUTPATIENT
Start: 2024-02-02

## 2024-04-10 ENCOUNTER — LAB REQUISITION (OUTPATIENT)
Dept: LAB | Facility: HOSPITAL | Age: 59
End: 2024-04-10
Payer: MEDICARE

## 2024-04-10 DIAGNOSIS — E78.5 HYPERLIPIDEMIA, UNSPECIFIED: ICD-10-CM

## 2024-04-10 DIAGNOSIS — R41.82 ALTERED MENTAL STATUS, UNSPECIFIED: ICD-10-CM

## 2024-04-10 DIAGNOSIS — Z79.899 OTHER LONG TERM (CURRENT) DRUG THERAPY: ICD-10-CM

## 2024-04-10 DIAGNOSIS — E72.20 DISORDER OF UREA CYCLE METABOLISM, UNSPECIFIED (MULTI): ICD-10-CM

## 2024-04-10 LAB
ALBUMIN SERPL BCP-MCNC: 4.3 G/DL (ref 3.4–5)
ALBUMIN SERPL BCP-MCNC: 4.3 G/DL (ref 3.4–5)
ALP SERPL-CCNC: 92 U/L (ref 33–136)
ALP SERPL-CCNC: 92 U/L (ref 33–136)
ALT SERPL W P-5'-P-CCNC: 20 U/L (ref 10–52)
ALT SERPL W P-5'-P-CCNC: 20 U/L (ref 10–52)
AMMONIA PLAS-SCNC: 84 UMOL/L (ref 16–53)
ANION GAP SERPL CALC-SCNC: 10 MMOL/L (ref 10–20)
AST SERPL W P-5'-P-CCNC: 17 U/L (ref 9–39)
AST SERPL W P-5'-P-CCNC: 17 U/L (ref 9–39)
BASOPHILS # BLD AUTO: 0.04 X10*3/UL (ref 0–0.1)
BASOPHILS NFR BLD AUTO: 0.5 %
BILIRUB DIRECT SERPL-MCNC: 0.1 MG/DL (ref 0–0.3)
BILIRUB SERPL-MCNC: 0.5 MG/DL (ref 0–1.2)
BILIRUB SERPL-MCNC: 0.5 MG/DL (ref 0–1.2)
BUN SERPL-MCNC: 21 MG/DL (ref 6–23)
CALCIUM SERPL-MCNC: 9.1 MG/DL (ref 8.6–10.3)
CHLORIDE SERPL-SCNC: 113 MMOL/L (ref 98–107)
CHOLEST SERPL-MCNC: 136 MG/DL (ref 0–199)
CHOLESTEROL/HDL RATIO: 3.6
CO2 SERPL-SCNC: 23 MMOL/L (ref 21–32)
CREAT SERPL-MCNC: 0.97 MG/DL (ref 0.5–1.3)
EGFRCR SERPLBLD CKD-EPI 2021: 89 ML/MIN/1.73M*2
EOSINOPHIL # BLD AUTO: 0.17 X10*3/UL (ref 0–0.7)
EOSINOPHIL NFR BLD AUTO: 2.1 %
ERYTHROCYTE [DISTWIDTH] IN BLOOD BY AUTOMATED COUNT: 13.2 % (ref 11.5–14.5)
GLUCOSE SERPL-MCNC: 99 MG/DL (ref 74–99)
HCT VFR BLD AUTO: 47.9 % (ref 41–52)
HDLC SERPL-MCNC: 37.6 MG/DL
HGB BLD-MCNC: 16.1 G/DL (ref 13.5–17.5)
HOLD SPECIMEN: NORMAL
HOLD SPECIMEN: NORMAL
IMM GRANULOCYTES # BLD AUTO: 0.02 X10*3/UL (ref 0–0.7)
IMM GRANULOCYTES NFR BLD AUTO: 0.3 % (ref 0–0.9)
LDLC SERPL CALC-MCNC: 75 MG/DL
LEVETIRACETAM SERPL-MCNC: 21 UG/ML (ref 10–40)
LYMPHOCYTES # BLD AUTO: 3.29 X10*3/UL (ref 1.2–4.8)
LYMPHOCYTES NFR BLD AUTO: 41.2 %
MAGNESIUM SERPL-MCNC: 1.94 MG/DL (ref 1.6–2.4)
MCH RBC QN AUTO: 28.2 PG (ref 26–34)
MCHC RBC AUTO-ENTMCNC: 33.6 G/DL (ref 32–36)
MCV RBC AUTO: 84 FL (ref 80–100)
MONOCYTES # BLD AUTO: 0.66 X10*3/UL (ref 0.1–1)
MONOCYTES NFR BLD AUTO: 8.3 %
NEUTROPHILS # BLD AUTO: 3.81 X10*3/UL (ref 1.2–7.7)
NEUTROPHILS NFR BLD AUTO: 47.6 %
NON HDL CHOLESTEROL: 98 MG/DL (ref 0–149)
NRBC BLD-RTO: 0 /100 WBCS (ref 0–0)
PLATELET # BLD AUTO: 162 X10*3/UL (ref 150–450)
POTASSIUM SERPL-SCNC: 4.2 MMOL/L (ref 3.5–5.3)
PROT SERPL-MCNC: 5.9 G/DL (ref 6.4–8.2)
PROT SERPL-MCNC: 5.9 G/DL (ref 6.4–8.2)
RBC # BLD AUTO: 5.71 X10*6/UL (ref 4.5–5.9)
SODIUM SERPL-SCNC: 142 MMOL/L (ref 136–145)
TRIGL SERPL-MCNC: 115 MG/DL (ref 0–149)
VLDL: 23 MG/DL (ref 0–40)
WBC # BLD AUTO: 8 X10*3/UL (ref 4.4–11.3)

## 2024-04-10 PROCEDURE — 83735 ASSAY OF MAGNESIUM: CPT | Mod: OUT | Performed by: INTERNAL MEDICINE

## 2024-04-10 PROCEDURE — 82140 ASSAY OF AMMONIA: CPT | Mod: OUT | Performed by: INTERNAL MEDICINE

## 2024-04-10 PROCEDURE — 80076 HEPATIC FUNCTION PANEL: CPT | Mod: CCI | Performed by: INTERNAL MEDICINE

## 2024-04-10 PROCEDURE — 80053 COMPREHEN METABOLIC PANEL: CPT | Mod: OUT | Performed by: INTERNAL MEDICINE

## 2024-04-10 PROCEDURE — 80183 DRUG SCRN QUANT OXCARBAZEPIN: CPT | Mod: OUT | Performed by: INTERNAL MEDICINE

## 2024-04-10 PROCEDURE — 85025 COMPLETE CBC W/AUTO DIFF WBC: CPT | Mod: OUT | Performed by: INTERNAL MEDICINE

## 2024-04-10 PROCEDURE — 80177 DRUG SCRN QUAN LEVETIRACETAM: CPT | Mod: OUT,PARLAB | Performed by: INTERNAL MEDICINE

## 2024-04-10 PROCEDURE — 80061 LIPID PANEL: CPT | Mod: OUT | Performed by: INTERNAL MEDICINE

## 2024-04-12 LAB — 10OH-CARBAZEPINE SERPL-MCNC: 12 UG/ML (ref 3–35)

## 2024-04-17 DIAGNOSIS — G43.711 CHRONIC MIGRAINE WITHOUT AURA, INTRACTABLE, WITH STATUS MIGRAINOSUS: ICD-10-CM

## 2024-04-17 RX ORDER — ERENUMAB-AOOE 140 MG/ML
INJECTION, SOLUTION SUBCUTANEOUS
Qty: 1 ML | Refills: 5 | Status: SHIPPED | OUTPATIENT
Start: 2024-04-17

## 2024-05-01 DIAGNOSIS — G43.711 INTRACTABLE CHRONIC MIGRAINE WITHOUT AURA AND WITH STATUS MIGRAINOSUS: ICD-10-CM

## 2024-05-01 RX ORDER — LISINOPRIL 10 MG/1
TABLET ORAL
Qty: 30 TABLET | Refills: 4 | Status: SHIPPED | OUTPATIENT
Start: 2024-05-01

## 2024-06-27 ENCOUNTER — LAB REQUISITION (OUTPATIENT)
Dept: LAB | Facility: HOSPITAL | Age: 59
End: 2024-06-27
Payer: MEDICARE

## 2024-06-27 DIAGNOSIS — Z79.899 OTHER LONG TERM (CURRENT) DRUG THERAPY: ICD-10-CM

## 2024-06-27 DIAGNOSIS — E72.20 DISORDER OF UREA CYCLE METABOLISM, UNSPECIFIED (MULTI): ICD-10-CM

## 2024-06-27 LAB
AMMONIA PLAS-SCNC: 50 UMOL/L (ref 16–53)
BASOPHILS # BLD AUTO: 0.04 X10*3/UL (ref 0–0.1)
BASOPHILS NFR BLD AUTO: 0.6 %
EOSINOPHIL # BLD AUTO: 0.13 X10*3/UL (ref 0–0.7)
EOSINOPHIL NFR BLD AUTO: 2 %
ERYTHROCYTE [DISTWIDTH] IN BLOOD BY AUTOMATED COUNT: 12.9 % (ref 11.5–14.5)
HCT VFR BLD AUTO: 47.6 % (ref 41–52)
HGB BLD-MCNC: 15.7 G/DL (ref 13.5–17.5)
HOLD SPECIMEN: NORMAL
HOLD SPECIMEN: NORMAL
IMM GRANULOCYTES # BLD AUTO: 0.02 X10*3/UL (ref 0–0.7)
IMM GRANULOCYTES NFR BLD AUTO: 0.3 % (ref 0–0.9)
LEVETIRACETAM SERPL-MCNC: 22 UG/ML (ref 10–40)
LYMPHOCYTES # BLD AUTO: 2.66 X10*3/UL (ref 1.2–4.8)
LYMPHOCYTES NFR BLD AUTO: 40.8 %
MCH RBC QN AUTO: 27.7 PG (ref 26–34)
MCHC RBC AUTO-ENTMCNC: 33 G/DL (ref 32–36)
MCV RBC AUTO: 84 FL (ref 80–100)
MONOCYTES # BLD AUTO: 0.55 X10*3/UL (ref 0.1–1)
MONOCYTES NFR BLD AUTO: 8.4 %
NEUTROPHILS # BLD AUTO: 3.12 X10*3/UL (ref 1.2–7.7)
NEUTROPHILS NFR BLD AUTO: 47.9 %
NRBC BLD-RTO: 0 /100 WBCS (ref 0–0)
PLATELET # BLD AUTO: 185 X10*3/UL (ref 150–450)
RBC # BLD AUTO: 5.66 X10*6/UL (ref 4.5–5.9)
WBC # BLD AUTO: 6.5 X10*3/UL (ref 4.4–11.3)

## 2024-06-27 PROCEDURE — 80177 DRUG SCRN QUAN LEVETIRACETAM: CPT | Mod: OUT,PARLAB | Performed by: INTERNAL MEDICINE

## 2024-06-27 PROCEDURE — 80183 DRUG SCRN QUANT OXCARBAZEPIN: CPT | Mod: OUT | Performed by: INTERNAL MEDICINE

## 2024-06-27 PROCEDURE — 85025 COMPLETE CBC W/AUTO DIFF WBC: CPT | Mod: OUT | Performed by: INTERNAL MEDICINE

## 2024-06-27 PROCEDURE — 82140 ASSAY OF AMMONIA: CPT | Mod: OUT | Performed by: INTERNAL MEDICINE

## 2024-06-28 DIAGNOSIS — G43.711 CHRONIC MIGRAINE WITHOUT AURA, INTRACTABLE, WITH STATUS MIGRAINOSUS: ICD-10-CM

## 2024-06-28 RX ORDER — IBUPROFEN 200 MG
TABLET ORAL
Qty: 60 TABLET | Refills: 5 | Status: SHIPPED | OUTPATIENT
Start: 2024-06-28

## 2024-06-30 LAB — 10OH-CARBAZEPINE SERPL-MCNC: 13 UG/ML (ref 3–35)

## 2024-07-29 DIAGNOSIS — G43.711 CHRONIC MIGRAINE WITHOUT AURA, INTRACTABLE, WITH STATUS MIGRAINOSUS: ICD-10-CM

## 2024-07-29 RX ORDER — ACETAZOLAMIDE 125 MG/1
TABLET ORAL
Qty: 60 TABLET | Refills: 4 | Status: SHIPPED | OUTPATIENT
Start: 2024-07-29

## 2024-08-16 ENCOUNTER — TELEMEDICINE (OUTPATIENT)
Dept: NEUROLOGY | Facility: HOSPITAL | Age: 59
End: 2024-08-16
Payer: MEDICARE

## 2024-08-16 DIAGNOSIS — G40.109 FOCAL EPILEPSY (MULTI): Primary | ICD-10-CM

## 2024-08-16 PROCEDURE — 99214 OFFICE O/P EST MOD 30 MIN: CPT | Mod: GT,95 | Performed by: NURSE PRACTITIONER

## 2024-08-16 PROCEDURE — 99214 OFFICE O/P EST MOD 30 MIN: CPT | Performed by: NURSE PRACTITIONER

## 2024-08-16 RX ORDER — VERAPAMIL HYDROCHLORIDE 240 MG/1
1 TABLET, FILM COATED, EXTENDED RELEASE ORAL DAILY
COMMUNITY
Start: 2018-05-23

## 2024-08-16 NOTE — PROGRESS NOTES
Virtual or Telephone Consent    An interactive audio and video telecommunication system which permits real time communications between the patient (at the originating site) and provider (at the distant site) was utilized to provide this telehealth service.   Verbal consent was requested and obtained from Edin Delgado on this date, 24 for a telehealth visit.        Patient ID:  Edin Delgado 59 y.o.male presenting in follow-up for epilepsy.     HPI    Epilepsy Classification:  Epileptic Paroxysmal Episodes  Epileptogenic Zone: left temporal                 Epileptic seizure semiology:  1. Type 1: Dialeptic seizures ? bilateral clonic seizures                      Frequency: none for years, mostly during childhood.                  2. Type 2: asymptomatic left temporal EEG seizure pattern               Frequency: during hemiplegic migraine attack.               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 No seizures, complaint with his medication - no side effects. Levels are therapeutic. No falls, he is sleeping through the night         Review of Systems   All other systems reviewed and are negative.        RESULTS:  No EEG results found for the past 12 months    Lab Requisition on 2024   Component Date Value    Ammonia 2024 50     Keppra 2024 22     Oxcarb or Eslicarb Metab* 2024 13     WBC 2024 6.5     nRBC 2024 0.0     RBC 2024 5.66     Hemoglobin 2024 15.7     Hematocrit 2024 47.6     MCV 2024 84     MCH 2024 27.7     MCHC 2024 33.0     RDW 2024 12.9      Platelets 06/27/2024 185     Neutrophils % 06/27/2024 47.9     Immature Granulocytes %,* 06/27/2024 0.3     Lymphocytes % 06/27/2024 40.8     Monocytes % 06/27/2024 8.4     Eosinophils % 06/27/2024 2.0     Basophils % 06/27/2024 0.6     Neutrophils Absolute 06/27/2024 3.12     Immature Granulocytes Ab* 06/27/2024 0.02     Lymphocytes Absolute 06/27/2024 2.66     Monocytes Absolute 06/27/2024 0.55     Eosinophils Absolute 06/27/2024 0.13     Basophils Absolute 06/27/2024 0.04     Extra Tube 06/27/2024 Hold for add-ons.     Extra Tube 06/27/2024 Hold for add-ons.        No results found for this or any previous visit (from the past 4464 hour(s)).    No MRI head results found for the past 12 months    No CT head results found for the past 12 months    No results found for this or any previous visit (from the past 4464 hour(s)).    Results for orders placed or performed in visit on 07/25/19   EEG    Narrative    Ordered by an unspecified provider.   Results for orders placed or performed in visit on 03/30/18   MR BRAIN W AND WO IV CONTRAST    Narrative    MRN: [id number]  Patient Name: JOHN FARLEY     STUDY:  MRI BRAIN W/WO CONTRAST; NR MRA HEAD W/O C; NR MRA NECK WO.C;  3/30/2018 12:18 pm     INDICATION:  Signs/Symptoms: Upgoing plantar reflex, unilateral hypertonia, Lie  Flat: Yes, Pre Med: No; Signs/Symptoms: Left sided weakness,  intermittent, concerning for conversion disorder, Lie Flat: Yes, Pre  Med: No.     COMPARISON:  May 2004.     ACCESSION NUMBER(S):  [id number]; [id number]; [id number]     ORDERING CLINICIAN:  LEENA NICOLAS     TECHNIQUE:  The brain was studied in the sagittal, axial and coronal planes  utilizing FLAIR, T1 and T2 weighted images both before and following  intravenous injection of 20 cc MultiHance.     FINDINGS:  *There is increased signal within the right cerebral cortex on FLAIR  and T2 weighted images in association with increased signal on  diffusion-weighted images not  "confirmed on ADC. The findings are  consistent with completed infarction in the distribution of the right  middle cerebral artery or right hemispheric encephalitis.  *There is no associated hemorrhage or mass effect  *There is no evidence of intracranial mass or extra-axial collection  *The skull base, paranasal sinuses and orbital structures are normal  *The brainstem and cerebellum are normal.  *Following contrast injection, there is gyriform enhancement  throughout the right hemisphere. This is a nonspecific finding  compatible with completed infarction as well as enhancement due to an  inflammatory process such as encephalitis with  * Magnetic resonance angiography  Axial 3-D time-of-flight acquisition was performed and multiplanar  reconstructions were made.  MRA at the carotid bifurcations reveals normal anatomic  configuration.  The vertebral arteries are normal.  There is no  evidence of narrowing or stenosis.  *MRA of the intracranial vessels demonstrates normal distal carotid  arteries, normal vertebral arteries, normal vertebrobasilar junction  and normal basilar artery. There is prominence of the right middle  cerebral artery possibly due to \"luxury perfusion\".     IMPRESSION:  *Findings consistent with completed infarct with gyriform enhancement  and luxury perfusion in the right hemisphere  *Findings consistent with right hemispheric encephalitis/cerebritis.  *THIS EXAMINATION WAS INTERPRET[place] [facility] [place]   [place] ANGIO NECK WO IV CONTRAST    Narrative    MRN: [id number]  Patient Name: JOHN FARLEY     STUDY:  MRI BRAIN W/WO CONTRAST; NR MRA HEAD W/O C; NR MRA NECK WO.C;  3/30/2018 12:18 pm     INDICATION:  Signs/Symptoms: Upgoing plantar reflex, unilateral hypertonia, Lie  Flat: Yes, Pre Med: No; Signs/Symptoms: Left sided weakness,  intermittent, concerning for conversion disorder, Lie Flat: Yes, Pre  Med: No.     COMPARISON:  May 2004.     ACCESSION NUMBER(S):  [id number]; [id number]; [id number]     ORDERING " "CLINICIAN:  LEENA INIGUEZ; PENNY NICOLAS     TECHNIQUE:  The brain was studied in the sagittal, axial and coronal planes  utilizing FLAIR, T1 and T2 weighted images both before and following  intravenous injection of 20 cc MultiHance.     FINDINGS:  *There is increased signal within the right cerebral cortex on FLAIR  and T2 weighted images in association with increased signal on  diffusion-weighted images not confirmed on ADC. The findings are  consistent with completed infarction in the distribution of the right  middle cerebral artery or right hemispheric encephalitis.  *There is no associated hemorrhage or mass effect  *There is no evidence of intracranial mass or extra-axial collection  *The skull base, paranasal sinuses and orbital structures are normal  *The brainstem and cerebellum are normal.  *Following contrast injection, there is gyriform enhancement  throughout the right hemisphere. This is a nonspecific finding  compatible with completed infarction as well as enhancement due to an  inflammatory process such as encephalitis with  * Magnetic resonance angiography  Axial 3-D time-of-flight acquisition was performed and multiplanar  reconstructions were made.  MRA at the carotid bifurcations reveals normal anatomic  configuration.  The vertebral arteries are normal.  There is no  evidence of narrowing or stenosis.  *MRA of the intracranial vessels demonstrates normal distal carotid  arteries, normal vertebral arteries, normal vertebrobasilar junction  and normal basilar artery. There is prominence of the right middle  cerebral artery possibly due to \"luxury perfusion\".     IMPRESSION:  *Findings consistent with completed infarct with gyriform enhancement  and luxury perfusion in the right hemisphere  *Findings consistent with right hemispheric encephalitis/cerebritis.  *THIS EXAMINATION WAS INTERPRETED AT [facility] – [place]   MR ANGIO HEAD WO IV CONTRAST    Narrative    MRN: [id number]  Patient Name: MIGUELITO" PETER     STUDY:  MRI BRAIN W/WO CONTRAST; NR MRA HEAD W/O C; NR MRA NECK WO.C;  3/30/2018 12:18 pm     INDICATION:  Signs/Symptoms: Upgoing plantar reflex, unilateral hypertonia, Lie  Flat: Yes, Pre Med: No; Signs/Symptoms: Left sided weakness,  intermittent, concerning for conversion disorder, Lie Flat: Yes, Pre  Med: No.     COMPARISON:  May 2004.     ACCESSION NUMBER(S):  [id number]; [id number]; [id number]     ORDERING CLINICIAN:  LEENA NICOLAS     TECHNIQUE:  The brain was studied in the sagittal, axial and coronal planes  utilizing FLAIR, T1 and T2 weighted images both before and following  intravenous injection of 20 cc MultiHance.     FINDINGS:  *There is increased signal within the right cerebral cortex on FLAIR  and T2 weighted images in association with increased signal on  diffusion-weighted images not confirmed on ADC. The findings are  consistent with completed infarction in the distribution of the right  middle cerebral artery or right hemispheric encephalitis.  *There is no associated hemorrhage or mass effect  *There is no evidence of intracranial mass or extra-axial collection  *The skull base, paranasal sinuses and orbital structures are normal  *The brainstem and cerebellum are normal.  *Following contrast injection, there is gyriform enhancement  throughout the right hemisphere. This is a nonspecific finding  compatible with completed infarction as well as enhancement due to an  inflammatory process such as encephalitis with  * Magnetic resonance angiography  Axial 3-D time-of-flight acquisition was performed and multiplanar  reconstructions were made.  MRA at the carotid bifurcations reveals normal anatomic  configuration.  The vertebral arteries are normal.  There is no  evidence of narrowing or stenosis.  *MRA of the intracranial vessels demonstrates normal distal carotid  arteries, normal vertebral arteries, normal vertebrobasilar junction  and normal basilar artery. There is  "prominence of the right middle  cerebral artery possibly due to \"luxury perfusion\".     IMPRESSION:  *Findings consistent with completed infarct with gyriform enhancement  and luxury perfusion in the right hemisphere  *Findings consistent with right hemispheric encephalitis/cerebritis.  *THIS EXAMINATION WAS INTERPRETED AT [facility] – [place]           There were no vitals filed for this visit.    Neurologic Exam     Mental Status   Oriented to person, place, and time.   Attention: normal. Concentration: normal.   Level of consciousness: alert  Knowledge: consistent with education.   Normal comprehension.     Cranial Nerves   Eye movements intact, no facial droop, hearing grossly intact      Gait, Coordination, and Reflexes No focal deficits noted            ASSESSMENT & PLAN:   59 y.o. male presenting in follow-up for peviously diagnosed epilepsy. Semiology as described above    Problem List Items Addressed This Visit       Focal epilepsy (Multi) - Primary       Stable, none for years   Recently in the ED with elevated ammonia, unknown cause   Continue -300 and -1500  Levels were therapeutic   RTC 9 months                "

## 2024-09-27 ENCOUNTER — LAB REQUISITION (OUTPATIENT)
Dept: LAB | Facility: HOSPITAL | Age: 59
End: 2024-09-27
Payer: COMMERCIAL

## 2024-09-27 DIAGNOSIS — G43.711 INTRACTABLE CHRONIC MIGRAINE WITHOUT AURA AND WITH STATUS MIGRAINOSUS: ICD-10-CM

## 2024-09-27 DIAGNOSIS — Z79.899 OTHER LONG TERM (CURRENT) DRUG THERAPY: ICD-10-CM

## 2024-09-27 DIAGNOSIS — E72.20 DISORDER OF UREA CYCLE METABOLISM, UNSPECIFIED (MULTI): ICD-10-CM

## 2024-09-27 LAB
ALBUMIN SERPL BCP-MCNC: 4 G/DL (ref 3.4–5)
ALP SERPL-CCNC: 87 U/L (ref 33–136)
ALT SERPL W P-5'-P-CCNC: 16 U/L (ref 10–52)
AMMONIA PLAS-SCNC: 43 UMOL/L (ref 16–53)
ANION GAP SERPL CALC-SCNC: 9 MMOL/L (ref 10–20)
AST SERPL W P-5'-P-CCNC: 13 U/L (ref 9–39)
BASOPHILS # BLD AUTO: 0.04 X10*3/UL (ref 0–0.1)
BASOPHILS NFR BLD AUTO: 0.6 %
BILIRUB DIRECT SERPL-MCNC: 0.1 MG/DL (ref 0–0.3)
BILIRUB SERPL-MCNC: 0.4 MG/DL (ref 0–1.2)
BUN SERPL-MCNC: 19 MG/DL (ref 6–23)
CALCIUM SERPL-MCNC: 8.7 MG/DL (ref 8.6–10.3)
CHLORIDE SERPL-SCNC: 113 MMOL/L (ref 98–107)
CHOLEST SERPL-MCNC: 142 MG/DL (ref 0–199)
CHOLESTEROL/HDL RATIO: 4
CO2 SERPL-SCNC: 24 MMOL/L (ref 21–32)
CREAT SERPL-MCNC: 0.92 MG/DL (ref 0.5–1.3)
EGFRCR SERPLBLD CKD-EPI 2021: >90 ML/MIN/1.73M*2
EOSINOPHIL # BLD AUTO: 0.14 X10*3/UL (ref 0–0.7)
EOSINOPHIL NFR BLD AUTO: 2.2 %
ERYTHROCYTE [DISTWIDTH] IN BLOOD BY AUTOMATED COUNT: 13.1 % (ref 11.5–14.5)
GLUCOSE SERPL-MCNC: 105 MG/DL (ref 74–99)
HCT VFR BLD AUTO: 47.5 % (ref 41–52)
HDLC SERPL-MCNC: 35.1 MG/DL
HGB BLD-MCNC: 15.5 G/DL (ref 13.5–17.5)
HOLD SPECIMEN: NORMAL
HOLD SPECIMEN: NORMAL
IMM GRANULOCYTES # BLD AUTO: 0.02 X10*3/UL (ref 0–0.7)
IMM GRANULOCYTES NFR BLD AUTO: 0.3 % (ref 0–0.9)
LDLC SERPL CALC-MCNC: 88 MG/DL
LEVETIRACETAM SERPL-MCNC: 20 UG/ML (ref 10–40)
LYMPHOCYTES # BLD AUTO: 2.58 X10*3/UL (ref 1.2–4.8)
LYMPHOCYTES NFR BLD AUTO: 40.1 %
MAGNESIUM SERPL-MCNC: 1.99 MG/DL (ref 1.6–2.4)
MCH RBC QN AUTO: 28.1 PG (ref 26–34)
MCHC RBC AUTO-ENTMCNC: 32.6 G/DL (ref 32–36)
MCV RBC AUTO: 86 FL (ref 80–100)
MONOCYTES # BLD AUTO: 0.52 X10*3/UL (ref 0.1–1)
MONOCYTES NFR BLD AUTO: 8.1 %
NEUTROPHILS # BLD AUTO: 3.13 X10*3/UL (ref 1.2–7.7)
NEUTROPHILS NFR BLD AUTO: 48.7 %
NON HDL CHOLESTEROL: 107 MG/DL (ref 0–149)
NRBC BLD-RTO: 0 /100 WBCS (ref 0–0)
PLATELET # BLD AUTO: 166 X10*3/UL (ref 150–450)
POTASSIUM SERPL-SCNC: 4.3 MMOL/L (ref 3.5–5.3)
PROT SERPL-MCNC: 5.9 G/DL (ref 6.4–8.2)
RBC # BLD AUTO: 5.51 X10*6/UL (ref 4.5–5.9)
SODIUM SERPL-SCNC: 142 MMOL/L (ref 136–145)
TRIGL SERPL-MCNC: 93 MG/DL (ref 0–149)
VLDL: 19 MG/DL (ref 0–40)
WBC # BLD AUTO: 6.4 X10*3/UL (ref 4.4–11.3)

## 2024-09-27 PROCEDURE — 80061 LIPID PANEL: CPT | Mod: OUT | Performed by: INTERNAL MEDICINE

## 2024-09-27 PROCEDURE — 80053 COMPREHEN METABOLIC PANEL: CPT | Mod: OUT | Performed by: INTERNAL MEDICINE

## 2024-09-27 PROCEDURE — 80183 DRUG SCRN QUANT OXCARBAZEPIN: CPT | Mod: OUT | Performed by: INTERNAL MEDICINE

## 2024-09-27 PROCEDURE — 82140 ASSAY OF AMMONIA: CPT | Mod: OUT | Performed by: INTERNAL MEDICINE

## 2024-09-27 PROCEDURE — 80177 DRUG SCRN QUAN LEVETIRACETAM: CPT | Mod: OUT,PARLAB | Performed by: INTERNAL MEDICINE

## 2024-09-27 PROCEDURE — 85025 COMPLETE CBC W/AUTO DIFF WBC: CPT | Mod: OUT | Performed by: INTERNAL MEDICINE

## 2024-09-27 PROCEDURE — 82248 BILIRUBIN DIRECT: CPT | Mod: OUT | Performed by: INTERNAL MEDICINE

## 2024-09-27 PROCEDURE — 83735 ASSAY OF MAGNESIUM: CPT | Mod: OUT | Performed by: INTERNAL MEDICINE

## 2024-09-27 RX ORDER — LISINOPRIL 10 MG/1
TABLET ORAL
Qty: 30 TABLET | Refills: 3 | Status: SHIPPED | OUTPATIENT
Start: 2024-09-27

## 2024-09-30 LAB — 10OH-CARBAZEPINE SERPL-MCNC: 11 UG/ML (ref 3–35)

## 2024-10-25 DIAGNOSIS — G40.109 FOCAL EPILEPSY (MULTI): ICD-10-CM

## 2024-10-28 RX ORDER — OXCARBAZEPINE 300 MG/1
TABLET, FILM COATED ORAL
Qty: 180 TABLET | Refills: 2 | Status: SHIPPED | OUTPATIENT
Start: 2024-10-28

## 2024-10-31 DIAGNOSIS — G43.711 CHRONIC MIGRAINE WITHOUT AURA, INTRACTABLE, WITH STATUS MIGRAINOSUS: ICD-10-CM

## 2024-10-31 RX ORDER — ERENUMAB-AOOE 140 MG/ML
INJECTION, SOLUTION SUBCUTANEOUS
Qty: 1 ML | Refills: 4 | Status: SHIPPED | OUTPATIENT
Start: 2024-10-31

## 2024-11-11 ENCOUNTER — APPOINTMENT (OUTPATIENT)
Dept: NEUROLOGY | Facility: CLINIC | Age: 59
End: 2024-11-11
Payer: MEDICARE

## 2024-11-11 VITALS
TEMPERATURE: 97.1 F | HEART RATE: 104 BPM | RESPIRATION RATE: 20 BRPM | WEIGHT: 198 LBS | SYSTOLIC BLOOD PRESSURE: 110 MMHG | DIASTOLIC BLOOD PRESSURE: 70 MMHG | BODY MASS INDEX: 26.24 KG/M2 | HEIGHT: 73 IN

## 2024-11-11 DIAGNOSIS — G43.711 CHRONIC MIGRAINE WITHOUT AURA, INTRACTABLE, WITH STATUS MIGRAINOSUS: Primary | ICD-10-CM

## 2024-11-11 PROCEDURE — 99214 OFFICE O/P EST MOD 30 MIN: CPT | Performed by: PSYCHIATRY & NEUROLOGY

## 2024-11-11 PROCEDURE — 3008F BODY MASS INDEX DOCD: CPT | Performed by: PSYCHIATRY & NEUROLOGY

## 2024-11-11 RX ORDER — GUAIFENESIN 1200 MG
650 TABLET, EXTENDED RELEASE 12 HR ORAL
COMMUNITY
Start: 2024-04-17

## 2024-11-11 RX ORDER — LACTULOSE 10 G/15ML
10 SOLUTION ORAL; RECTAL
COMMUNITY
Start: 2024-07-15 | End: 2024-11-11 | Stop reason: ALTCHOICE

## 2024-11-11 RX ORDER — OLANZAPINE 5 MG/1
TABLET ORAL
COMMUNITY
Start: 2024-10-13

## 2024-11-11 RX ORDER — ADHESIVE BANDAGE
30 BANDAGE TOPICAL
COMMUNITY
Start: 2024-04-17

## 2024-11-11 ASSESSMENT — PATIENT HEALTH QUESTIONNAIRE - PHQ9
1. LITTLE INTEREST OR PLEASURE IN DOING THINGS: NOT AT ALL
SUM OF ALL RESPONSES TO PHQ9 QUESTIONS 1 AND 2: 0
2. FEELING DOWN, DEPRESSED OR HOPELESS: NOT AT ALL

## 2024-11-11 NOTE — ASSESSMENT & PLAN NOTE
Patient thought his migraines increased but nursing notes and  do not confirm. It may have been an IM injection inadvertently because of the patient complaints of location and soreness. Retry Aimovig SQ x 1 month then call to report on efficacy. If not helpful will switch to Vyepti a quarterly IV

## 2024-11-11 NOTE — PROGRESS NOTES
"Accompanied by facility staff.     Continues Aimovig every 28 days. .   Feels migraines have increased. 2-3 a week according to the patient. But no migraines for the last couple weeks.  2-3 per month last year.   Feels that nothing is helpful.  Treats with Acetaminophen and ibuprofen but he states they are not helpful. Denies constipation.     Takes 400mg ibuprofen in evening. States they have not been giving him any extra.   Staff is not aware of worsening headaches and not notes documenting this.     Migraines is left frontal . Describes as pressure. Rare in occipital any longer.   Associated noise sensitivity.  Triggers are none in particular.     Most occur in the evening. Headache will keep him awake.     Sleeps \"pretty good\". \"My pills make me tired\"  Mood has been stable. Feels controlled.     Remote past had script for Alprazolam for migraine aura/hemiplegia. Denies weakness on either side of body with headaches currently.   Remote past treatments rizatriptan as well. Not currently on med list.         To review what we discussed today   Assessment/Plan   Problem List Items Addressed This Visit       Chronic migraine without aura, intractable, with status migrainosus - Primary     Patient thought his migraines increased but nursing notes and  do not confirm. It may have been an IM injection inadvertently because of the patient complaints of location and soreness. Retry Aimovig SQ x 1 month then call to report on efficacy. If not helpful will switch to Vyepti a quarterly IV             Your next appointment with me i6 months    Thank you for visiting the office of Dr Shayy Jiménez. It was a pleasure working with you today.   Rona Palomo[address] Mon-Thursday  [facility] [address] [facility] Fridays  Our office phone number is [phone]. You can use this number to leave messages for Fany or to schedule or reschedule an appointment or request refills.  If you were seen on Thursday afternoon or " Friday our office will call on Monday or Tuesday to reschedule your appointment. If you do not receive a call please call us.   We are also available for messages on my chart. We make every effort to respond to your concerns by the end of the next business day.

## 2024-11-14 DIAGNOSIS — G40.109 FOCAL EPILEPSY (MULTI): ICD-10-CM

## 2024-11-14 RX ORDER — LEVETIRACETAM 500 MG/1
TABLET ORAL
Qty: 360 TABLET | Refills: 2 | Status: SHIPPED | OUTPATIENT
Start: 2024-11-14

## 2024-11-20 DIAGNOSIS — G40.109 FOCAL EPILEPSY (MULTI): ICD-10-CM

## 2024-11-20 RX ORDER — LEVETIRACETAM 500 MG/1
TABLET ORAL
Qty: 360 TABLET | Refills: 3 | Status: SHIPPED | OUTPATIENT
Start: 2024-11-20 | End: 2025-11-20

## 2024-12-19 ENCOUNTER — LAB REQUISITION (OUTPATIENT)
Dept: LAB | Facility: HOSPITAL | Age: 59
End: 2024-12-19
Payer: MEDICARE

## 2024-12-19 DIAGNOSIS — I10 ESSENTIAL (PRIMARY) HYPERTENSION: ICD-10-CM

## 2024-12-19 DIAGNOSIS — E55.9 VITAMIN D DEFICIENCY, UNSPECIFIED: ICD-10-CM

## 2024-12-19 DIAGNOSIS — E78.5 HYPERLIPIDEMIA, UNSPECIFIED: ICD-10-CM

## 2024-12-19 DIAGNOSIS — Z12.5 ENCOUNTER FOR SCREENING FOR MALIGNANT NEOPLASM OF PROSTATE: ICD-10-CM

## 2024-12-19 DIAGNOSIS — Z79.899 OTHER LONG TERM (CURRENT) DRUG THERAPY: ICD-10-CM

## 2024-12-19 LAB
25(OH)D3 SERPL-MCNC: 74 NG/ML (ref 30–100)
AMMONIA PLAS-SCNC: 58 UMOL/L (ref 16–53)
BASOPHILS # BLD AUTO: 0.05 X10*3/UL (ref 0–0.1)
BASOPHILS NFR BLD AUTO: 0.7 %
EOSINOPHIL # BLD AUTO: 0.15 X10*3/UL (ref 0–0.7)
EOSINOPHIL NFR BLD AUTO: 2.2 %
ERYTHROCYTE [DISTWIDTH] IN BLOOD BY AUTOMATED COUNT: 13 % (ref 11.5–14.5)
HCT VFR BLD AUTO: 46.4 % (ref 41–52)
HGB BLD-MCNC: 15.1 G/DL (ref 13.5–17.5)
HOLD SPECIMEN: NORMAL
IMM GRANULOCYTES # BLD AUTO: 0.03 X10*3/UL (ref 0–0.7)
IMM GRANULOCYTES NFR BLD AUTO: 0.4 % (ref 0–0.9)
LEVETIRACETAM SERPL-MCNC: 24 UG/ML (ref 10–40)
LYMPHOCYTES # BLD AUTO: 2.98 X10*3/UL (ref 1.2–4.8)
LYMPHOCYTES NFR BLD AUTO: 44.3 %
MCH RBC QN AUTO: 27.6 PG (ref 26–34)
MCHC RBC AUTO-ENTMCNC: 32.5 G/DL (ref 32–36)
MCV RBC AUTO: 85 FL (ref 80–100)
MONOCYTES # BLD AUTO: 0.57 X10*3/UL (ref 0.1–1)
MONOCYTES NFR BLD AUTO: 8.5 %
NEUTROPHILS # BLD AUTO: 2.94 X10*3/UL (ref 1.2–7.7)
NEUTROPHILS NFR BLD AUTO: 43.9 %
NRBC BLD-RTO: 0 /100 WBCS (ref 0–0)
PLATELET # BLD AUTO: 147 X10*3/UL (ref 150–450)
PSA SERPL-MCNC: 0.56 NG/ML
RBC # BLD AUTO: 5.47 X10*6/UL (ref 4.5–5.9)
TSH SERPL-ACNC: 1.91 MIU/L (ref 0.44–3.98)
WBC # BLD AUTO: 6.7 X10*3/UL (ref 4.4–11.3)

## 2024-12-19 PROCEDURE — 82306 VITAMIN D 25 HYDROXY: CPT | Mod: OUT,PARLAB | Performed by: INTERNAL MEDICINE

## 2024-12-19 PROCEDURE — 85025 COMPLETE CBC W/AUTO DIFF WBC: CPT | Mod: OUT | Performed by: INTERNAL MEDICINE

## 2024-12-19 PROCEDURE — 82140 ASSAY OF AMMONIA: CPT | Mod: OUT | Performed by: INTERNAL MEDICINE

## 2024-12-19 PROCEDURE — 80177 DRUG SCRN QUAN LEVETIRACETAM: CPT | Mod: OUT,PARLAB | Performed by: INTERNAL MEDICINE

## 2024-12-19 PROCEDURE — 80183 DRUG SCRN QUANT OXCARBAZEPIN: CPT | Mod: OUT | Performed by: INTERNAL MEDICINE

## 2024-12-19 PROCEDURE — 84443 ASSAY THYROID STIM HORMONE: CPT | Mod: OUT | Performed by: INTERNAL MEDICINE

## 2024-12-19 PROCEDURE — 84153 ASSAY OF PSA TOTAL: CPT | Mod: OUT,PARLAB | Performed by: INTERNAL MEDICINE

## 2024-12-22 LAB — 10OH-CARBAZEPINE SERPL-MCNC: 9.8 UG/ML (ref 10–35)

## 2024-12-26 DIAGNOSIS — G43.711 CHRONIC MIGRAINE WITHOUT AURA, INTRACTABLE, WITH STATUS MIGRAINOSUS: ICD-10-CM

## 2024-12-26 RX ORDER — ACETAZOLAMIDE 125 MG/1
TABLET ORAL
Qty: 60 TABLET | Refills: 3 | Status: SHIPPED | OUTPATIENT
Start: 2024-12-26

## 2024-12-26 RX ORDER — IBUPROFEN 200 MG
TABLET ORAL
Qty: 60 TABLET | Refills: 4 | Status: SHIPPED | OUTPATIENT
Start: 2024-12-26

## 2025-01-28 DIAGNOSIS — G43.711 INTRACTABLE CHRONIC MIGRAINE WITHOUT AURA AND WITH STATUS MIGRAINOSUS: ICD-10-CM

## 2025-01-29 RX ORDER — LISINOPRIL 10 MG/1
TABLET ORAL
Qty: 30 TABLET | Refills: 2 | Status: SHIPPED | OUTPATIENT
Start: 2025-01-29

## 2025-03-19 DIAGNOSIS — G43.711 CHRONIC MIGRAINE WITHOUT AURA, INTRACTABLE, WITH STATUS MIGRAINOSUS: ICD-10-CM

## 2025-03-19 RX ORDER — ERENUMAB-AOOE 140 MG/ML
140 INJECTION, SOLUTION SUBCUTANEOUS
Qty: 1 ML | Refills: 3 | Status: SHIPPED | OUTPATIENT
Start: 2025-03-19

## 2025-03-19 RX ORDER — DULOXETIN HYDROCHLORIDE 30 MG/1
CAPSULE, DELAYED RELEASE ORAL
COMMUNITY
Start: 2025-03-10

## 2025-03-19 RX ORDER — IBUPROFEN 800 MG/1
TABLET ORAL
COMMUNITY
Start: 2024-12-30

## 2025-04-23 DIAGNOSIS — G43.711 CHRONIC MIGRAINE WITHOUT AURA, INTRACTABLE, WITH STATUS MIGRAINOSUS: ICD-10-CM

## 2025-04-23 DIAGNOSIS — G43.711 INTRACTABLE CHRONIC MIGRAINE WITHOUT AURA AND WITH STATUS MIGRAINOSUS: ICD-10-CM

## 2025-04-23 RX ORDER — ACETAZOLAMIDE 125 MG/1
TABLET ORAL
Qty: 60 TABLET | Refills: 2 | Status: SHIPPED | OUTPATIENT
Start: 2025-04-23

## 2025-04-23 RX ORDER — LISINOPRIL 10 MG/1
TABLET ORAL
Qty: 30 TABLET | Refills: 1 | Status: SHIPPED | OUTPATIENT
Start: 2025-04-23

## 2025-05-09 ENCOUNTER — TELEMEDICINE (OUTPATIENT)
Dept: NEUROLOGY | Facility: HOSPITAL | Age: 60
End: 2025-05-09
Payer: MEDICARE

## 2025-05-09 DIAGNOSIS — G40.109 FOCAL EPILEPSY (MULTI): Primary | ICD-10-CM

## 2025-05-09 PROCEDURE — 1036F TOBACCO NON-USER: CPT | Performed by: NURSE PRACTITIONER

## 2025-05-09 PROCEDURE — 99213 OFFICE O/P EST LOW 20 MIN: CPT | Performed by: NURSE PRACTITIONER

## 2025-05-09 PROCEDURE — G2211 COMPLEX E/M VISIT ADD ON: HCPCS | Performed by: NURSE PRACTITIONER

## 2025-05-09 RX ORDER — OXCARBAZEPINE 300 MG/1
300 TABLET, FILM COATED ORAL 2 TIMES DAILY
Qty: 180 TABLET | Refills: 3 | Status: SHIPPED | OUTPATIENT
Start: 2025-05-09 | End: 2026-05-09

## 2025-05-09 RX ORDER — LEVETIRACETAM 500 MG/1
TABLET ORAL
Qty: 360 TABLET | Refills: 3 | Status: SHIPPED | OUTPATIENT
Start: 2025-05-09 | End: 2026-05-09

## 2025-05-09 NOTE — PROGRESS NOTES
Virtual or Telephone Consent    An interactive audio and video telecommunication system which permits real time communications between the patient (at the originating site) and provider (at the distant site) was utilized to provide this telehealth service.   Verbal consent was requested and obtained from Edin Delgado on this date, 25 for a telehealth visit and the patient's location was confirmed at the time of the visit.           Patient ID:  Edin Delgado 60 y.o.male presenting in follow-up for epilepsy.     HPI    Epilepsy Classification:  Epileptic Paroxysmal Episodes  Epileptogenic Zone: left temporal                 Epileptic seizure semiology:  1. Type 1: Dialeptic seizures ? bilateral clonic seizures                      Frequency: none for years, mostly during childhood.                  2. Type 2: asymptomatic left temporal EEG seizure pattern               Frequency: during hemiplegic migraine attack.               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 No seizures, complaint with his medication - no side effects. Levels are therapeutic. No falls, he is sleeping through the night         Review of Systems   All other systems reviewed and are negative.        RESULTS:  No EEG results found for the past 12 months    No visits with results within 2 Month(s) from this visit.   Latest known visit with results is:   Lab Requisition on 2024   Component Date Value    Ammonia 2024 58 (H)     Oxcarb Metabolite 2024 9.8 (L)     Keppra 2024 24     WBC 2024 6.7     nRBC 2024 0.0     RBC 2024  5.47     Hemoglobin 12/19/2024 15.1     Hematocrit 12/19/2024 46.4     MCV 12/19/2024 85     MCH 12/19/2024 27.6     MCHC 12/19/2024 32.5     RDW 12/19/2024 13.0     Platelets 12/19/2024 147 (L)     Neutrophils % 12/19/2024 43.9     Immature Granulocytes %,* 12/19/2024 0.4     Lymphocytes % 12/19/2024 44.3     Monocytes % 12/19/2024 8.5     Eosinophils % 12/19/2024 2.2     Basophils % 12/19/2024 0.7     Neutrophils Absolute 12/19/2024 2.94     Immature Granulocytes Ab* 12/19/2024 0.03     Lymphocytes Absolute 12/19/2024 2.98     Monocytes Absolute 12/19/2024 0.57     Eosinophils Absolute 12/19/2024 0.15     Basophils Absolute 12/19/2024 0.05     Prostate Specific Antige* 12/19/2024 0.56     Thyroid Stimulating Horm* 12/19/2024 1.91     Vitamin D, 25-Hydroxy, T* 12/19/2024 74     Extra Tube 12/19/2024 Hold for add-ons.        No results found for this or any previous visit (from the past 4464 hours).    No MRI head results found for the past 12 months    No CT head results found for the past 12 months    No results found for this or any previous visit (from the past 4464 hours).    Results for orders placed or performed in visit on 07/25/19   EEG    Narrative    Ordered by an unspecified provider.   Results for orders placed or performed in visit on 03/30/18   MR BRAIN W AND WO IV CONTRAST    Narrative    MRN: [id number]  Patient Name: JOHN FARLEY     STUDY:  MRI BRAIN W/WO CONTRAST; NR MRA HEAD W/O C; NR MRA NECK WO.C;  3/30/2018 12:18 pm     INDICATION:  Signs/Symptoms: Upgoing plantar reflex, unilateral hypertonia, Lie  Flat: Yes, Pre Med: No; Signs/Symptoms: Left sided weakness,  intermittent, concerning for conversion disorder, Lie Flat: Yes, Pre  Med: No.     COMPARISON:  May 2004.     ACCESSION NUMBER(S):  [id number]; [id number]; [id number]     ORDERING CLINICIAN:  LEENA NICOLAS     TECHNIQUE:  The brain was studied in the sagittal, axial and coronal planes  utilizing FLAIR, T1 and T2  "weighted images both before and following  intravenous injection of 20 cc MultiHance.     FINDINGS:  *There is increased signal within the right cerebral cortex on FLAIR  and T2 weighted images in association with increased signal on  diffusion-weighted images not confirmed on ADC. The findings are  consistent with completed infarction in the distribution of the right  middle cerebral artery or right hemispheric encephalitis.  *There is no associated hemorrhage or mass effect  *There is no evidence of intracranial mass or extra-axial collection  *The skull base, paranasal sinuses and orbital structures are normal  *The brainstem and cerebellum are normal.  *Following contrast injection, there is gyriform enhancement  throughout the right hemisphere. This is a nonspecific finding  compatible with completed infarction as well as enhancement due to an  inflammatory process such as encephalitis with  * Magnetic resonance angiography  Axial 3-D time-of-flight acquisition was performed and multiplanar  reconstructions were made.  MRA at the carotid bifurcations reveals normal anatomic  configuration.  The vertebral arteries are normal.  There is no  evidence of narrowing or stenosis.  *MRA of the intracranial vessels demonstrates normal distal carotid  arteries, normal vertebral arteries, normal vertebrobasilar junction  and normal basilar artery. There is prominence of the right middle  cerebral artery possibly due to \"luxury perfusion\".     IMPRESSION:  *Findings consistent with completed infarct with gyriform enhancement  and luxury perfusion in the right hemisphere  *Findings consistent with right hemispheric encephalitis/cerebritis.  *THIS EXAMINATION WAS INTERPRETED AT [facility] – [place]   MR ANGIO NECK WO IV CONTRAST    Narrative    MRN: [id number]  Patient Name: JOHN FARLEY     STUDY:  MRI BRAIN W/WO CONTRAST; NR MRA HEAD W/O C; NR MRA NECK WO.C;  3/30/2018 12:18 pm     INDICATION:  Signs/Symptoms: Upgoing plantar reflex, " "unilateral hypertonia, Lie  Flat: Yes, Pre Med: No; Signs/Symptoms: Left sided weakness,  intermittent, concerning for conversion disorder, Lie Flat: Yes, Pre  Med: No.     COMPARISON:  May 2004.     ACCESSION NUMBER(S):  [id number]; [id number]; [id number]     ORDERING CLINICIAN:  LEENA INIGUEZ; PENNY NICOLAS     TECHNIQUE:  The brain was studied in the sagittal, axial and coronal planes  utilizing FLAIR, T1 and T2 weighted images both before and following  intravenous injection of 20 cc MultiHance.     FINDINGS:  *There is increased signal within the right cerebral cortex on FLAIR  and T2 weighted images in association with increased signal on  diffusion-weighted images not confirmed on ADC. The findings are  consistent with completed infarction in the distribution of the right  middle cerebral artery or right hemispheric encephalitis.  *There is no associated hemorrhage or mass effect  *There is no evidence of intracranial mass or extra-axial collection  *The skull base, paranasal sinuses and orbital structures are normal  *The brainstem and cerebellum are normal.  *Following contrast injection, there is gyriform enhancement  throughout the right hemisphere. This is a nonspecific finding  compatible with completed infarction as well as enhancement due to an  inflammatory process such as encephalitis with  * Magnetic resonance angiography  Axial 3-D time-of-flight acquisition was performed and multiplanar  reconstructions were made.  MRA at the carotid bifurcations reveals normal anatomic  configuration.  The vertebral arteries are normal.  There is no  evidence of narrowing or stenosis.  *MRA of the intracranial vessels demonstrates normal distal carotid  arteries, normal vertebral arteries, normal vertebrobasilar junction  and normal basilar artery. There is prominence of the right middle  cerebral artery possibly due to \"luxury perfusion\".     IMPRESSION:  *Findings consistent with completed infarct with " gyriform enhancement  and luxury perfusion in the right hemisphere  *Findings consistent with right hemispheric encephalitis/cerebritis.  *THIS EXAMINATION WAS INTERPRETED AT [facility] [place]   MR ANGIO HEAD WO IV CONTRAST    Narrative    MRN: [id number]  Patient Name: JOHN FARLEY     STUDY:  MRI BRAIN W/WO CONTRAST; NR MRA HEAD W/O C; NR MRA NECK WO.C;  3/30/2018 12:18 pm     INDICATION:  Signs/Symptoms: Upgoing plantar reflex, unilateral hypertonia, Lie  Flat: Yes, Pre Med: No; Signs/Symptoms: Left sided weakness,  intermittent, concerning for conversion disorder, Lie Flat: Yes, Pre  Med: No.     COMPARISON:  May 2004.     ACCESSION NUMBER(S):  [id number]; [id number]; [id number]     ORDERING CLINICIAN:  LEENA NICOLAS     TECHNIQUE:  The brain was studied in the sagittal, axial and coronal planes  utilizing FLAIR, T1 and T2 weighted images both before and following  intravenous injection of 20 cc MultiHance.     FINDINGS:  *There is increased signal within the right cerebral cortex on FLAIR  and T2 weighted images in association with increased signal on  diffusion-weighted images not confirmed on ADC. The findings are  consistent with completed infarction in the distribution of the right  middle cerebral artery or right hemispheric encephalitis.  *There is no associated hemorrhage or mass effect  *There is no evidence of intracranial mass or extra-axial collection  *The skull base, paranasal sinuses and orbital structures are normal  *The brainstem and cerebellum are normal.  *Following contrast injection, there is gyriform enhancement  throughout the right hemisphere. This is a nonspecific finding  compatible with completed infarction as well as enhancement due to an  inflammatory process such as encephalitis with  * Magnetic resonance angiography  Axial 3-D time-of-flight acquisition was performed and multiplanar  reconstructions were made.  MRA at the carotid bifurcations reveals normal  "anatomic  configuration.  The vertebral arteries are normal.  There is no  evidence of narrowing or stenosis.  *MRA of the intracranial vessels demonstrates normal distal carotid  arteries, normal vertebral arteries, normal vertebrobasilar junction  and normal basilar artery. There is prominence of the right middle  cerebral artery possibly due to \"luxury perfusion\".     IMPRESSION:  *Findings consistent with completed infarct with gyriform enhancement  and luxury perfusion in the right hemisphere  *Findings consistent with right hemispheric encephalitis/cerebritis.  *[place] [facility] [place] were no vitals filed for this visit.    Neurologic Exam     This examination was performed over telehealth discussion--Patient is alert and oriented. Speech is fluid, without dysarthria. Able to appropriately give information about past history and current events. Further objective neurological testing not possible given nature of phone interview.         ASSESSMENT & PLAN:   60 y.o. male presenting in follow-up for peviously diagnosed epilepsy. Semiology as described above    Problem List Items Addressed This Visit       Focal epilepsy (Multi) - Primary     No new complaints today. No recent ER visits     Stable, none for years   Continue -300 and -1500  OXC level slightly low in dec though not concerning given he remains seizure free  CMP with next blood draw   RTC 9 months                "

## 2025-05-23 DIAGNOSIS — G43.711 CHRONIC MIGRAINE WITHOUT AURA, INTRACTABLE, WITH STATUS MIGRAINOSUS: ICD-10-CM

## 2025-05-24 RX ORDER — IBUPROFEN 200 MG
TABLET ORAL
Qty: 60 TABLET | Refills: 3 | Status: SHIPPED | OUTPATIENT
Start: 2025-05-24

## 2025-06-20 DIAGNOSIS — G43.711 CHRONIC MIGRAINE WITHOUT AURA, INTRACTABLE, WITH STATUS MIGRAINOSUS: ICD-10-CM

## 2025-06-20 DIAGNOSIS — G43.711 INTRACTABLE CHRONIC MIGRAINE WITHOUT AURA AND WITH STATUS MIGRAINOSUS: ICD-10-CM

## 2025-06-20 RX ORDER — ERENUMAB-AOOE 140 MG/ML
140 INJECTION, SOLUTION SUBCUTANEOUS
Qty: 1 ML | Refills: 5 | Status: SHIPPED | OUTPATIENT
Start: 2025-06-20

## 2025-06-20 RX ORDER — LISINOPRIL 10 MG/1
TABLET ORAL
Qty: 30 TABLET | Refills: 5 | Status: SHIPPED | OUTPATIENT
Start: 2025-06-20

## 2025-07-22 DIAGNOSIS — G43.711 CHRONIC MIGRAINE WITHOUT AURA, INTRACTABLE, WITH STATUS MIGRAINOSUS: ICD-10-CM

## 2025-07-23 RX ORDER — ACETAZOLAMIDE 125 MG/1
TABLET ORAL
Qty: 60 TABLET | Refills: 1 | Status: SHIPPED | OUTPATIENT
Start: 2025-07-23

## 2025-10-27 ENCOUNTER — APPOINTMENT (OUTPATIENT)
Dept: NEUROLOGY | Facility: CLINIC | Age: 60
End: 2025-10-27
Payer: MEDICARE